# Patient Record
Sex: MALE | Race: BLACK OR AFRICAN AMERICAN | NOT HISPANIC OR LATINO | Employment: OTHER | URBAN - METROPOLITAN AREA
[De-identification: names, ages, dates, MRNs, and addresses within clinical notes are randomized per-mention and may not be internally consistent; named-entity substitution may affect disease eponyms.]

---

## 2023-02-08 PROBLEM — E78.2 HYPERLIPEMIA, MIXED: Status: ACTIVE | Noted: 2023-02-08

## 2023-02-08 PROBLEM — N52.9 MALE ERECTILE DISORDER OF ORGANIC ORIGIN: Status: ACTIVE | Noted: 2023-02-08

## 2023-02-08 PROBLEM — I10 ESSENTIAL (PRIMARY) HYPERTENSION: Status: ACTIVE | Noted: 2023-02-08

## 2023-02-08 PROBLEM — E11.9 CONTROLLED TYPE 2 DIABETES MELLITUS WITHOUT COMPLICATION, WITHOUT LONG-TERM CURRENT USE OF INSULIN (MULTI): Status: ACTIVE | Noted: 2023-02-08

## 2023-02-08 RX ORDER — ATENOLOL 50 MG/1
1 TABLET ORAL DAILY
COMMUNITY
Start: 2021-03-28 | End: 2023-08-14 | Stop reason: SDUPTHER

## 2023-02-08 RX ORDER — TIMOLOL MALEATE 5 MG/ML
SOLUTION/ DROPS OPHTHALMIC
COMMUNITY
Start: 2021-09-11

## 2023-02-08 RX ORDER — METFORMIN HYDROCHLORIDE 1000 MG/1
1 TABLET ORAL 2 TIMES DAILY
COMMUNITY
Start: 2021-01-24 | End: 2023-04-26 | Stop reason: SDUPTHER

## 2023-02-08 RX ORDER — LISINOPRIL 20 MG/1
1 TABLET ORAL DAILY
COMMUNITY
Start: 2021-01-31 | End: 2023-08-07

## 2023-02-08 RX ORDER — LATANOPROST 50 UG/ML
SOLUTION/ DROPS OPHTHALMIC
COMMUNITY
Start: 2020-04-30

## 2023-02-08 RX ORDER — LOVASTATIN 20 MG/1
1 TABLET ORAL DAILY
COMMUNITY
Start: 2021-02-07 | End: 2023-08-07

## 2023-02-08 RX ORDER — GLIPIZIDE 5 MG/1
1 TABLET ORAL DAILY
COMMUNITY
Start: 2021-07-19 | End: 2023-07-12 | Stop reason: DRUGHIGH

## 2023-02-08 RX ORDER — SILDENAFIL 100 MG/1
.5-1 TABLET, FILM COATED ORAL AS NEEDED
COMMUNITY
Start: 2021-01-10 | End: 2023-07-07 | Stop reason: SDUPTHER

## 2023-02-08 RX ORDER — HYDROCHLOROTHIAZIDE 25 MG/1
0.5 TABLET ORAL DAILY
COMMUNITY
Start: 2020-12-03 | End: 2023-08-14

## 2023-03-22 ENCOUNTER — OFFICE VISIT (OUTPATIENT)
Dept: PRIMARY CARE | Facility: CLINIC | Age: 73
End: 2023-03-22
Payer: MEDICARE

## 2023-03-22 VITALS
SYSTOLIC BLOOD PRESSURE: 132 MMHG | BODY MASS INDEX: 25.29 KG/M2 | HEART RATE: 74 BPM | OXYGEN SATURATION: 97 % | HEIGHT: 73 IN | DIASTOLIC BLOOD PRESSURE: 85 MMHG | WEIGHT: 190.8 LBS | TEMPERATURE: 97 F

## 2023-03-22 DIAGNOSIS — E66.3 OVERWEIGHT WITH BODY MASS INDEX (BMI) 25.0-29.9: ICD-10-CM

## 2023-03-22 DIAGNOSIS — E11.9 CONTROLLED TYPE 2 DIABETES MELLITUS WITHOUT COMPLICATION, WITHOUT LONG-TERM CURRENT USE OF INSULIN (MULTI): Primary | ICD-10-CM

## 2023-03-22 DIAGNOSIS — I10 ESSENTIAL (PRIMARY) HYPERTENSION: ICD-10-CM

## 2023-03-22 DIAGNOSIS — E11.65 TYPE 2 DIABETES MELLITUS WITH HYPERGLYCEMIA, WITHOUT LONG-TERM CURRENT USE OF INSULIN (MULTI): ICD-10-CM

## 2023-03-22 DIAGNOSIS — E78.2 HYPERLIPEMIA, MIXED: ICD-10-CM

## 2023-03-22 LAB — POC HEMOGLOBIN A1C: 7.7 % (ref 4.2–6.5)

## 2023-03-22 PROCEDURE — 99213 OFFICE O/P EST LOW 20 MIN: CPT | Performed by: FAMILY MEDICINE

## 2023-03-22 PROCEDURE — 3075F SYST BP GE 130 - 139MM HG: CPT | Performed by: FAMILY MEDICINE

## 2023-03-22 PROCEDURE — 1159F MED LIST DOCD IN RCRD: CPT | Performed by: FAMILY MEDICINE

## 2023-03-22 PROCEDURE — 83036 HEMOGLOBIN GLYCOSYLATED A1C: CPT | Performed by: FAMILY MEDICINE

## 2023-03-22 PROCEDURE — 4010F ACE/ARB THERAPY RXD/TAKEN: CPT | Performed by: FAMILY MEDICINE

## 2023-03-22 PROCEDURE — 3079F DIAST BP 80-89 MM HG: CPT | Performed by: FAMILY MEDICINE

## 2023-03-22 PROCEDURE — 1160F RVW MEDS BY RX/DR IN RCRD: CPT | Performed by: FAMILY MEDICINE

## 2023-03-22 PROCEDURE — 1036F TOBACCO NON-USER: CPT | Performed by: FAMILY MEDICINE

## 2023-03-22 ASSESSMENT — ENCOUNTER SYMPTOMS
WEIGHT LOSS: 0
BLURRED VISION: 0
FATIGUE: 0

## 2023-03-22 ASSESSMENT — PATIENT HEALTH QUESTIONNAIRE - PHQ9
SUM OF ALL RESPONSES TO PHQ9 QUESTIONS 1 AND 2: 0
2. FEELING DOWN, DEPRESSED OR HOPELESS: NOT AT ALL
1. LITTLE INTEREST OR PLEASURE IN DOING THINGS: NOT AT ALL

## 2023-03-22 ASSESSMENT — PAIN SCALES - GENERAL: PAINLEVEL: 0-NO PAIN

## 2023-03-22 NOTE — ASSESSMENT & PLAN NOTE
A1C up, at 7.7.  Continue glipizide 5 mg daily and 1000 mg metformin BID.  Recheck 3-4 months.  Work on improving diet

## 2023-03-22 NOTE — PATIENT INSTRUCTIONS
A1C 7.7, is up from last time.  Work on improving diet.  Continue Glipizide 5 mg daily and metformin 1000 mg twice a day.  Plan to follow up in 3-4 months, and will recheck A1C, along with blood work for cholesterol and kidneys.  For diabetes:  Continue current medicines.                                              Treatment goals include:  HgbA1C less than 7.5  /80 on consistent basis, with no higher than 140/85  LDL cholesterol less than 100, and HDL cholesterol above 40.  Yearly retinal exam  Check feet periodically for sores or decreased sensation  Exercise at least 150 minutes weekly.  Work toward a goal BMI of less than 25.    BP is controlled.  Continue lisinopril 20 mg and hydrochlorothiazide 25 mg daily.

## 2023-03-22 NOTE — PROGRESS NOTES
"Subjective   Patient ID: Juan Alberto Salcido is a 72 y.o. male who presents for Diabetes (Pt. Presents for DM follow up ).  A1C has gone up since last time.  Eating too much and drinking pop, per patient.  Glucose 167 this AM.   Feeling well   No swelling in extremities.  Not checking BP at home.    Diabetes  He presents for his follow-up diabetic visit. He has type 2 diabetes mellitus. There are no hypoglycemic associated symptoms. Pertinent negatives for diabetes include no blurred vision, no chest pain, no fatigue, no foot paresthesias and no weight loss. Risk factors for coronary artery disease include diabetes mellitus, dyslipidemia and hypertension. He is compliant with treatment all of the time.       Review of Systems   Constitutional:  Negative for fatigue and weight loss.   Eyes:  Negative for blurred vision.   Cardiovascular:  Negative for chest pain.       Objective   /85 (BP Location: Right arm, Patient Position: Sitting, BP Cuff Size: Adult)   Pulse 74   Temp 36.1 °C (97 °F) (Temporal)   Ht 1.854 m (6' 1\")   Wt 86.5 kg (190 lb 12.8 oz)   SpO2 97%   BMI 25.17 kg/m²     Physical Exam  Constitutional:       Appearance: Normal appearance.   Cardiovascular:      Rate and Rhythm: Normal rate and regular rhythm.      Heart sounds: No murmur heard.  Musculoskeletal:      Right lower leg: No edema.      Left lower leg: No edema.   Neurological:      Mental Status: He is alert.           Assessment/Plan   Problem List Items Addressed This Visit          Circulatory    Essential (primary) hypertension     BP controlled.  Continue lisinopril 10 mg 25 mg hydrochlorothiazide  Check blood work next time.            Endocrine/Metabolic    Type 2 diabetes mellitus with hyperglycemia, without long-term current use of insulin (CMS/[facility]) - Primary     A1C up, at 7.7.  Continue glipizide 5 mg daily and 1000 mg metformin BID.  Recheck 3-4 months.  Work on improving diet            Other    Hyperlipemia, mixed     " Continue lovastatin.          Other Visit Diagnoses       Overweight with body mass index (BMI) 25.0-29.9

## 2023-04-26 DIAGNOSIS — E11.65 TYPE 2 DIABETES MELLITUS WITH HYPERGLYCEMIA, WITHOUT LONG-TERM CURRENT USE OF INSULIN (MULTI): Primary | ICD-10-CM

## 2023-04-26 RX ORDER — METFORMIN HYDROCHLORIDE 1000 MG/1
1000 TABLET ORAL 2 TIMES DAILY
Qty: 180 TABLET | Refills: 1 | Status: SHIPPED | OUTPATIENT
Start: 2023-04-26 | End: 2023-10-24

## 2023-07-07 DIAGNOSIS — N52.9 MALE ERECTILE DISORDER OF ORGANIC ORIGIN: Primary | ICD-10-CM

## 2023-07-07 RX ORDER — SILDENAFIL 100 MG/1
50-100 TABLET, FILM COATED ORAL AS NEEDED
Qty: 10 TABLET | Refills: 0 | Status: SHIPPED | OUTPATIENT
Start: 2023-07-07 | End: 2023-07-10 | Stop reason: SDUPTHER

## 2023-07-10 DIAGNOSIS — N52.9 MALE ERECTILE DISORDER OF ORGANIC ORIGIN: ICD-10-CM

## 2023-07-10 RX ORDER — SILDENAFIL 100 MG/1
50-100 TABLET, FILM COATED ORAL AS NEEDED
Qty: 10 TABLET | Refills: 0 | Status: SHIPPED | OUTPATIENT
Start: 2023-07-10 | End: 2023-08-09

## 2023-07-12 ENCOUNTER — OFFICE VISIT (OUTPATIENT)
Dept: PRIMARY CARE | Facility: CLINIC | Age: 73
End: 2023-07-12
Payer: MEDICARE

## 2023-07-12 VITALS
WEIGHT: 188 LBS | RESPIRATION RATE: 16 BRPM | BODY MASS INDEX: 24.92 KG/M2 | DIASTOLIC BLOOD PRESSURE: 80 MMHG | HEART RATE: 75 BPM | HEIGHT: 73 IN | OXYGEN SATURATION: 98 % | SYSTOLIC BLOOD PRESSURE: 120 MMHG

## 2023-07-12 DIAGNOSIS — I10 ESSENTIAL (PRIMARY) HYPERTENSION: ICD-10-CM

## 2023-07-12 DIAGNOSIS — E78.2 HYPERLIPEMIA, MIXED: ICD-10-CM

## 2023-07-12 DIAGNOSIS — E11.65 TYPE 2 DIABETES MELLITUS WITH HYPERGLYCEMIA, WITHOUT LONG-TERM CURRENT USE OF INSULIN (MULTI): Primary | ICD-10-CM

## 2023-07-12 LAB
ALBUMIN (MG/L) IN URINE: 201.5 MG/L
ALBUMIN/CREATININE (UG/MG) IN URINE: 82.6 UG/MG CRT (ref 0–30)
ANION GAP IN SER/PLAS: 14 MMOL/L (ref 10–20)
CALCIUM (MG/DL) IN SER/PLAS: 9.6 MG/DL (ref 8.6–10.6)
CARBON DIOXIDE, TOTAL (MMOL/L) IN SER/PLAS: 30 MMOL/L (ref 21–32)
CHLORIDE (MMOL/L) IN SER/PLAS: 100 MMOL/L (ref 98–107)
CHOLESTEROL (MG/DL) IN SER/PLAS: 75 MG/DL (ref 0–199)
CHOLESTEROL IN HDL (MG/DL) IN SER/PLAS: 39.7 MG/DL
CHOLESTEROL/HDL RATIO: 1.9
CREATININE (MG/DL) IN SER/PLAS: 0.88 MG/DL (ref 0.5–1.3)
CREATININE (MG/DL) IN URINE: 244 MG/DL (ref 20–370)
GFR MALE: >90 ML/MIN/1.73M2
GLUCOSE (MG/DL) IN SER/PLAS: 159 MG/DL (ref 74–99)
LDL: 10 MG/DL (ref 0–99)
POC HEMOGLOBIN A1C: 7.7 % (ref 4.2–6.5)
POTASSIUM (MMOL/L) IN SER/PLAS: 4.5 MMOL/L (ref 3.5–5.3)
SODIUM (MMOL/L) IN SER/PLAS: 139 MMOL/L (ref 136–145)
TRIGLYCERIDE (MG/DL) IN SER/PLAS: 126 MG/DL (ref 0–149)
UREA NITROGEN (MG/DL) IN SER/PLAS: 11 MG/DL (ref 6–23)
VLDL: 25 MG/DL (ref 0–40)

## 2023-07-12 PROCEDURE — 83036 HEMOGLOBIN GLYCOSYLATED A1C: CPT | Performed by: FAMILY MEDICINE

## 2023-07-12 PROCEDURE — 1160F RVW MEDS BY RX/DR IN RCRD: CPT | Performed by: FAMILY MEDICINE

## 2023-07-12 PROCEDURE — 3074F SYST BP LT 130 MM HG: CPT | Performed by: FAMILY MEDICINE

## 2023-07-12 PROCEDURE — 80061 LIPID PANEL: CPT

## 2023-07-12 PROCEDURE — 82043 UR ALBUMIN QUANTITATIVE: CPT

## 2023-07-12 PROCEDURE — 1126F AMNT PAIN NOTED NONE PRSNT: CPT | Performed by: FAMILY MEDICINE

## 2023-07-12 PROCEDURE — 80048 BASIC METABOLIC PNL TOTAL CA: CPT

## 2023-07-12 PROCEDURE — 82570 ASSAY OF URINE CREATININE: CPT

## 2023-07-12 PROCEDURE — 4010F ACE/ARB THERAPY RXD/TAKEN: CPT | Performed by: FAMILY MEDICINE

## 2023-07-12 PROCEDURE — 3079F DIAST BP 80-89 MM HG: CPT | Performed by: FAMILY MEDICINE

## 2023-07-12 PROCEDURE — 1036F TOBACCO NON-USER: CPT | Performed by: FAMILY MEDICINE

## 2023-07-12 PROCEDURE — 1159F MED LIST DOCD IN RCRD: CPT | Performed by: FAMILY MEDICINE

## 2023-07-12 PROCEDURE — 3008F BODY MASS INDEX DOCD: CPT | Performed by: FAMILY MEDICINE

## 2023-07-12 PROCEDURE — 99213 OFFICE O/P EST LOW 20 MIN: CPT | Performed by: FAMILY MEDICINE

## 2023-07-12 RX ORDER — GLIPIZIDE 5 MG/1
5 TABLET ORAL
Qty: 180 TABLET | Refills: 1 | Status: SHIPPED | OUTPATIENT
Start: 2023-07-12 | End: 2023-09-26 | Stop reason: SDUPTHER

## 2023-07-12 ASSESSMENT — PATIENT HEALTH QUESTIONNAIRE - PHQ9
2. FEELING DOWN, DEPRESSED OR HOPELESS: NOT AT ALL
SUM OF ALL RESPONSES TO PHQ9 QUESTIONS 1 AND 2: 0
1. LITTLE INTEREST OR PLEASURE IN DOING THINGS: NOT AT ALL

## 2023-07-12 ASSESSMENT — PAIN SCALES - GENERAL: PAINLEVEL: 0-NO PAIN

## 2023-07-12 NOTE — PROGRESS NOTES
"Subjective   Patient ID: Juan Alberto Salcido is a 73 y.o. male who presents for Diabetes and Hypertension.  Checks glucose once in a while.  Has been a little  high.  Taking metformin 1000 mg twice a day and glipizide 5 mg  daily.  No SE to medication.      Diabetes  He presents for his follow-up diabetic visit. He has type 2 diabetes mellitus. His disease course has been stable. There are no hypoglycemic associated symptoms. Pertinent negatives for diabetes include no blurred vision, no chest pain and no foot paresthesias. Risk factors for coronary artery disease include diabetes mellitus, dyslipidemia, hypertension and male sex. Current diabetic treatment includes oral agent (dual therapy). He is compliant with treatment all of the time. He is following a generally healthy diet. An ACE inhibitor/angiotensin II receptor blocker is being taken.   Hypertension  This is a chronic problem. The problem is controlled. Pertinent negatives include no blurred vision, chest pain, palpitations, peripheral edema or shortness of breath.       Review of Systems   Eyes:  Negative for blurred vision.   Respiratory:  Negative for shortness of breath.    Cardiovascular:  Negative for chest pain and palpitations.       Objective   /80 (BP Location: Left arm, Patient Position: Sitting, BP Cuff Size: Adult)   Pulse 75   Resp 16   Ht 1.854 m (6' 1\")   Wt 85.3 kg (188 lb)   SpO2 98%   BMI 24.80 kg/m²     Physical Exam  Vitals reviewed.   Constitutional:       Appearance: Normal appearance.   Cardiovascular:      Rate and Rhythm: Normal rate and regular rhythm.      Heart sounds: No murmur heard.  Pulmonary:      Effort: Pulmonary effort is normal.      Breath sounds: Normal breath sounds.   Musculoskeletal:      Right lower leg: No edema.      Left lower leg: No edema.   Neurological:      Mental Status: He is alert.           Assessment/Plan   Problem List Items Addressed This Visit       Type 2 diabetes mellitus with " hyperglycemia, without long-term current use of insulin (CMS/[facility]) - Primary    Relevant Orders    POCT glycosylated hemoglobin (Hb A1C) manually resulted (Completed)    Albumin , Urine Random (Completed)    Basic Metabolic Panel (Completed)    Lipid Panel (Completed)    Essential (primary) hypertension    Hyperlipemia, mixed     Other Visit Diagnoses       Body mass index (BMI) 24.0-24.9, adult

## 2023-07-12 NOTE — PATIENT INSTRUCTIONS
A1C 7.7, same as last time.  Work on improving diet.  Increase Glipizide 5 mg to twice a day,  and continue metformin 1000 mg twice a day.  Plan to follow up in 3-4 months, and will recheck A1C.  Will check fasting  blood work this morning.  For diabetes:  Continue current medicines.                                              Treatment goals include:  HgbA1C less than 7.5  /80 on consistent basis, with no higher than 140/85  LDL cholesterol less than 100, and HDL cholesterol above 40.  Yearly retinal exam  Check feet periodically for sores or decreased sensation  Exercise at least 150 minutes weekly.  Work toward a goal BMI of less than 25.     BP is controlled.  Continue lisinopril 20 mg and hydrochlorothiazide 25 mg daily.

## 2023-07-13 ASSESSMENT — ENCOUNTER SYMPTOMS
PALPITATIONS: 0
HYPERTENSION: 1
BLURRED VISION: 0
SHORTNESS OF BREATH: 0

## 2023-08-07 DIAGNOSIS — E78.2 HYPERLIPEMIA, MIXED: ICD-10-CM

## 2023-08-07 DIAGNOSIS — I10 ESSENTIAL (PRIMARY) HYPERTENSION: Primary | ICD-10-CM

## 2023-08-07 RX ORDER — LISINOPRIL 20 MG/1
20 TABLET ORAL DAILY
Qty: 90 TABLET | Refills: 0 | Status: SHIPPED | OUTPATIENT
Start: 2023-08-07 | End: 2023-11-13

## 2023-08-07 RX ORDER — LOVASTATIN 20 MG/1
20 TABLET ORAL DAILY
Qty: 90 TABLET | Refills: 0 | Status: SHIPPED | OUTPATIENT
Start: 2023-08-07 | End: 2023-11-13

## 2023-08-13 DIAGNOSIS — I10 ESSENTIAL (PRIMARY) HYPERTENSION: Primary | ICD-10-CM

## 2023-08-14 RX ORDER — HYDROCHLOROTHIAZIDE 25 MG/1
12.5 TABLET ORAL DAILY
Qty: 45 TABLET | Refills: 1 | Status: SHIPPED | OUTPATIENT
Start: 2023-08-14 | End: 2024-03-03

## 2023-08-14 RX ORDER — ATENOLOL 50 MG/1
50 TABLET ORAL DAILY
Qty: 90 TABLET | Refills: 1 | Status: SHIPPED | OUTPATIENT
Start: 2023-08-14 | End: 2024-02-10

## 2023-09-26 DIAGNOSIS — E11.65 TYPE 2 DIABETES MELLITUS WITH HYPERGLYCEMIA, WITHOUT LONG-TERM CURRENT USE OF INSULIN (MULTI): Primary | ICD-10-CM

## 2023-09-26 RX ORDER — GLIPIZIDE 5 MG/1
5 TABLET ORAL
Qty: 180 TABLET | Refills: 1 | Status: SHIPPED | OUTPATIENT
Start: 2023-09-26 | End: 2024-04-08

## 2023-10-25 ENCOUNTER — OFFICE VISIT (OUTPATIENT)
Dept: PRIMARY CARE | Facility: CLINIC | Age: 73
End: 2023-10-25
Payer: MEDICARE

## 2023-10-25 VITALS
HEART RATE: 80 BPM | WEIGHT: 186 LBS | HEIGHT: 73 IN | TEMPERATURE: 98.2 F | SYSTOLIC BLOOD PRESSURE: 118 MMHG | OXYGEN SATURATION: 98 % | BODY MASS INDEX: 24.65 KG/M2 | DIASTOLIC BLOOD PRESSURE: 74 MMHG

## 2023-10-25 DIAGNOSIS — E11.65 TYPE 2 DIABETES MELLITUS WITH HYPERGLYCEMIA, WITHOUT LONG-TERM CURRENT USE OF INSULIN (MULTI): Primary | ICD-10-CM

## 2023-10-25 DIAGNOSIS — N52.9 MALE ERECTILE DISORDER OF ORGANIC ORIGIN: ICD-10-CM

## 2023-10-25 DIAGNOSIS — Z23 ENCOUNTER FOR IMMUNIZATION: ICD-10-CM

## 2023-10-25 DIAGNOSIS — S83.412A SPRAIN OF MEDIAL COLLATERAL LIGAMENT OF LEFT KNEE, INITIAL ENCOUNTER: ICD-10-CM

## 2023-10-25 DIAGNOSIS — I10 ESSENTIAL (PRIMARY) HYPERTENSION: ICD-10-CM

## 2023-10-25 LAB — POC HEMOGLOBIN A1C: 7.7 % (ref 4.2–6.5)

## 2023-10-25 PROCEDURE — 4010F ACE/ARB THERAPY RXD/TAKEN: CPT | Performed by: FAMILY MEDICINE

## 2023-10-25 PROCEDURE — 1036F TOBACCO NON-USER: CPT | Performed by: FAMILY MEDICINE

## 2023-10-25 PROCEDURE — 90662 IIV NO PRSV INCREASED AG IM: CPT | Performed by: FAMILY MEDICINE

## 2023-10-25 PROCEDURE — G0008 ADMIN INFLUENZA VIRUS VAC: HCPCS | Performed by: FAMILY MEDICINE

## 2023-10-25 PROCEDURE — 3008F BODY MASS INDEX DOCD: CPT | Performed by: FAMILY MEDICINE

## 2023-10-25 PROCEDURE — 99214 OFFICE O/P EST MOD 30 MIN: CPT | Performed by: FAMILY MEDICINE

## 2023-10-25 PROCEDURE — 1160F RVW MEDS BY RX/DR IN RCRD: CPT | Performed by: FAMILY MEDICINE

## 2023-10-25 PROCEDURE — 3078F DIAST BP <80 MM HG: CPT | Performed by: FAMILY MEDICINE

## 2023-10-25 PROCEDURE — 83036 HEMOGLOBIN GLYCOSYLATED A1C: CPT | Performed by: FAMILY MEDICINE

## 2023-10-25 PROCEDURE — 1125F AMNT PAIN NOTED PAIN PRSNT: CPT | Performed by: FAMILY MEDICINE

## 2023-10-25 PROCEDURE — 1159F MED LIST DOCD IN RCRD: CPT | Performed by: FAMILY MEDICINE

## 2023-10-25 PROCEDURE — 3074F SYST BP LT 130 MM HG: CPT | Performed by: FAMILY MEDICINE

## 2023-10-25 RX ORDER — TADALAFIL 20 MG/1
20 TABLET ORAL DAILY PRN
Qty: 12 TABLET | Refills: 3 | Status: SHIPPED | OUTPATIENT
Start: 2023-10-25 | End: 2024-10-24

## 2023-10-25 ASSESSMENT — PATIENT HEALTH QUESTIONNAIRE - PHQ9
SUM OF ALL RESPONSES TO PHQ9 QUESTIONS 1 AND 2: 0
1. LITTLE INTEREST OR PLEASURE IN DOING THINGS: NOT AT ALL
2. FEELING DOWN, DEPRESSED OR HOPELESS: NOT AT ALL

## 2023-10-25 ASSESSMENT — PAIN SCALES - GENERAL: PAINLEVEL: 5

## 2023-10-25 ASSESSMENT — ENCOUNTER SYMPTOMS: DIABETIC ASSOCIATED SYMPTOMS: 0

## 2023-10-25 NOTE — PROGRESS NOTES
"Subjective   Patient ID: Juan Alberto Salcido is a 73 y.o. male who presents for Diabetes and Knee Pain (Chronic left knee).  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  Probably eats more carbs than should.    Left knee twisted a while back.  No swelling.  Still hurts a little.  Hurts more when on feet    Viagra not working as well as it used to.    Taking glipizide BID and metformin BID.    Diabetes  He presents for his follow-up diabetic visit. He has type 2 diabetes mellitus. His disease course has been stable. There are no hypoglycemic associated symptoms. There are no diabetic associated symptoms. Diabetic complications include impotence. Pertinent negatives for diabetic complications include no autonomic neuropathy, CVA, heart disease or nephropathy.   Knee Pain       Review of Systems   Genitourinary:  Positive for impotence.       Objective   /74 (BP Location: Left arm, Patient Position: Sitting, BP Cuff Size: Small adult)   Pulse 80   Temp 36.8 °C (98.2 °F) (Oral)   Ht 1.854 m (6' 1\")   Wt 84.4 kg (186 lb)   SpO2 98%   BMI 24.54 kg/m²     Physical Exam  Vitals reviewed.   Constitutional:       Appearance: Normal appearance.   Cardiovascular:      Rate and Rhythm: Normal rate and regular rhythm.      Heart sounds: No murmur heard.  Pulmonary:      Effort: Pulmonary effort is normal.      Breath sounds: Normal breath sounds.   Musculoskeletal:      Left knee: No swelling, deformity, effusion, erythema or crepitus. Normal range of motion. Tenderness (medially) present over the medial joint line. No LCL laxity, MCL laxity, ACL laxity or PCL laxity.     Instability Tests: Medial Lovely test negative and lateral Lovely test negative.      Right lower leg: No edema.      Left lower leg: No edema.   Neurological:      Mental Status: He is alert.         Assessment/Plan   Problem List Items Addressed This Visit       Type 2 diabetes mellitus with hyperglycemia, without long-term current use of insulin (CMS/[facility]) "    Relevant Orders    POCT glycosylated hemoglobin (Hb A1C) manually resulted

## 2023-10-25 NOTE — PATIENT INSTRUCTIONS
A1C 7.7, same as last time.  Work on improving diet.  Continue Glipizide 5 mg twice a day,  and continue metformin 1000 mg twice a day.  Plan to follow up in 3-4 months, and will recheck A1C.                                               Treatment goals include:  HgbA1C less than 7.5  /80 on consistent basis, with no higher than 140/85  LDL cholesterol less than 100, and HDL cholesterol above 40.  Yearly retinal exam  Check feet periodically for sores or decreased sensation  Exercise at least 150 minutes weekly.  Work toward a goal BMI of less than 25.     BP is controlled.  Continue lisinopril 20 mg and hydrochlorothiazide 25 mg daily.              For ED, will try Cialis instead of Viagra.  There is a 72 hour period of time in which it should work.    For knee continue knee support at work.  Ice if needed.  Follow up if worsening.

## 2024-01-28 DIAGNOSIS — E11.65 TYPE 2 DIABETES MELLITUS WITH HYPERGLYCEMIA, WITHOUT LONG-TERM CURRENT USE OF INSULIN (MULTI): ICD-10-CM

## 2024-01-28 RX ORDER — METFORMIN HYDROCHLORIDE 1000 MG/1
1000 TABLET ORAL 2 TIMES DAILY
Qty: 180 TABLET | Refills: 0 | Status: SHIPPED | OUTPATIENT
Start: 2024-01-28 | End: 2024-05-08

## 2024-02-28 ENCOUNTER — OFFICE VISIT (OUTPATIENT)
Dept: PRIMARY CARE | Facility: CLINIC | Age: 74
End: 2024-02-28
Payer: MEDICARE

## 2024-02-28 VITALS
WEIGHT: 182 LBS | BODY MASS INDEX: 24.12 KG/M2 | HEIGHT: 73 IN | OXYGEN SATURATION: 97 % | SYSTOLIC BLOOD PRESSURE: 114 MMHG | DIASTOLIC BLOOD PRESSURE: 80 MMHG | TEMPERATURE: 97.6 F | HEART RATE: 87 BPM

## 2024-02-28 DIAGNOSIS — E11.65 TYPE 2 DIABETES MELLITUS WITH HYPERGLYCEMIA, WITHOUT LONG-TERM CURRENT USE OF INSULIN (MULTI): ICD-10-CM

## 2024-02-28 DIAGNOSIS — Z23 NEED FOR VACCINATION: ICD-10-CM

## 2024-02-28 DIAGNOSIS — E78.2 HYPERLIPEMIA, MIXED: ICD-10-CM

## 2024-02-28 DIAGNOSIS — Z13.6 SCREENING FOR CARDIOVASCULAR CONDITION: ICD-10-CM

## 2024-02-28 DIAGNOSIS — I10 ESSENTIAL (PRIMARY) HYPERTENSION: ICD-10-CM

## 2024-02-28 DIAGNOSIS — N52.9 MALE ERECTILE DISORDER OF ORGANIC ORIGIN: ICD-10-CM

## 2024-02-28 DIAGNOSIS — Z12.5 SCREENING FOR PROSTATE CANCER: ICD-10-CM

## 2024-02-28 DIAGNOSIS — Z00.00 ROUTINE GENERAL MEDICAL EXAMINATION AT HEALTH CARE FACILITY: Primary | ICD-10-CM

## 2024-02-28 DIAGNOSIS — Z13.1 DIABETES MELLITUS SCREENING: ICD-10-CM

## 2024-02-28 LAB
ANION GAP SERPL CALC-SCNC: 15 MMOL/L (ref 10–20)
BUN SERPL-MCNC: 22 MG/DL (ref 6–23)
CALCIUM SERPL-MCNC: 9.7 MG/DL (ref 8.6–10.6)
CHLORIDE SERPL-SCNC: 99 MMOL/L (ref 98–107)
CHOLEST SERPL-MCNC: 75 MG/DL (ref 0–199)
CHOLESTEROL/HDL RATIO: 2
CO2 SERPL-SCNC: 29 MMOL/L (ref 21–32)
CREAT SERPL-MCNC: 1.09 MG/DL (ref 0.5–1.3)
EGFRCR SERPLBLD CKD-EPI 2021: 72 ML/MIN/1.73M*2
GLUCOSE SERPL-MCNC: 140 MG/DL (ref 74–99)
HDLC SERPL-MCNC: 38.3 MG/DL
LDLC SERPL CALC-MCNC: 14 MG/DL
NON HDL CHOLESTEROL: 37 MG/DL (ref 0–149)
POTASSIUM SERPL-SCNC: 4.6 MMOL/L (ref 3.5–5.3)
PSA SERPL-MCNC: 1.09 NG/ML
SODIUM SERPL-SCNC: 138 MMOL/L (ref 136–145)
TRIGL SERPL-MCNC: 113 MG/DL (ref 0–149)
VLDL: 23 MG/DL (ref 0–40)

## 2024-02-28 PROCEDURE — 1160F RVW MEDS BY RX/DR IN RCRD: CPT | Performed by: FAMILY MEDICINE

## 2024-02-28 PROCEDURE — 1036F TOBACCO NON-USER: CPT | Performed by: FAMILY MEDICINE

## 2024-02-28 PROCEDURE — 80061 LIPID PANEL: CPT

## 2024-02-28 PROCEDURE — 3074F SYST BP LT 130 MM HG: CPT | Performed by: FAMILY MEDICINE

## 2024-02-28 PROCEDURE — 36415 COLL VENOUS BLD VENIPUNCTURE: CPT

## 2024-02-28 PROCEDURE — 99212 OFFICE O/P EST SF 10 MIN: CPT | Performed by: FAMILY MEDICINE

## 2024-02-28 PROCEDURE — G0103 PSA SCREENING: HCPCS

## 2024-02-28 PROCEDURE — 1159F MED LIST DOCD IN RCRD: CPT | Performed by: FAMILY MEDICINE

## 2024-02-28 PROCEDURE — G0439 PPPS, SUBSEQ VISIT: HCPCS | Performed by: FAMILY MEDICINE

## 2024-02-28 PROCEDURE — 99397 PER PM REEVAL EST PAT 65+ YR: CPT | Performed by: FAMILY MEDICINE

## 2024-02-28 PROCEDURE — 3008F BODY MASS INDEX DOCD: CPT | Performed by: FAMILY MEDICINE

## 2024-02-28 PROCEDURE — 80048 BASIC METABOLIC PNL TOTAL CA: CPT

## 2024-02-28 PROCEDURE — 1125F AMNT PAIN NOTED PAIN PRSNT: CPT | Performed by: FAMILY MEDICINE

## 2024-02-28 PROCEDURE — 3079F DIAST BP 80-89 MM HG: CPT | Performed by: FAMILY MEDICINE

## 2024-02-28 PROCEDURE — 4010F ACE/ARB THERAPY RXD/TAKEN: CPT | Performed by: FAMILY MEDICINE

## 2024-02-28 PROCEDURE — 1170F FXNL STATUS ASSESSED: CPT | Performed by: FAMILY MEDICINE

## 2024-02-28 RX ORDER — SILDENAFIL 100 MG/1
100 TABLET, FILM COATED ORAL DAILY PRN
Qty: 12 TABLET | Refills: 3 | Status: SHIPPED | OUTPATIENT
Start: 2024-02-28 | End: 2025-02-27

## 2024-02-28 ASSESSMENT — ACTIVITIES OF DAILY LIVING (ADL)
GROCERY_SHOPPING: INDEPENDENT
BATHING: INDEPENDENT
TAKING_MEDICATION: INDEPENDENT
DOING_HOUSEWORK: INDEPENDENT
MANAGING_FINANCES: INDEPENDENT
DRESSING: INDEPENDENT

## 2024-02-28 ASSESSMENT — ENCOUNTER SYMPTOMS
OCCASIONAL FEELINGS OF UNSTEADINESS: 0
LOSS OF SENSATION IN FEET: 0
DEPRESSION: 0

## 2024-02-28 NOTE — PATIENT INSTRUCTIONS
Immunizations recommended:  --Flu shot every fall. -done  --Prevnar 20 once, to reduce risk of pneumonia. Out of stock today, so can do at the pharmacy or at next visit.  --Shingrix, to reduce the risk of shingles.  It can be done at the pharmacy.  --Tetanus every 10 years.  --Covid vaccine.  --RSV vaccine once.    Colonoscopy should be done every 10 years after the age of 45, unless there was a previous abnormality, or family history of colon cancer. (Will check to see when done)  Alternatives would be Cologuard every 3 years (looks for genetic evidence of colon cancer in stool), or stool FIT stool test yearly (looks for microscopic blood in stool)    You should try to get 150 minutes of exercise weekly.  Be sure to eat a diet rich in fruits and vegetables, and low in saturated fats and sodium.  Strive for a healthy BMI of less than 25.     Make sure to wear sun screen when outside, and check for changing or unusual moles.    I recommend you get a Living Will and Durable Power of  for Healthcare. These documents state what care you would want if you couldn't speak for yourself. They help your loved ones in caring for you if that happens.   Once you have those forms completed, you can keep a copy on file with your doctor.    Specialists being seen:  None currently.      A1C 7.3 in January.  Work on improving diet.  Continue Glipizide 5 mg twice a day,  and continue metformin 1000 mg twice a day.  Plan to follow up in 3-4 months, and will recheck A1C.                                               Treatment goals include:  HgbA1C less than 7.5  /80 on consistent basis, with no higher than 140/85  LDL cholesterol less than 100, and HDL cholesterol above 40.  Yearly retinal exam  Check feet periodically for sores or decreased sensation  Exercise at least 150 minutes weekly.  Work toward a goal BMI of less than 25.     BP is controlled.  Continue lisinopril 20 mg and hydrochlorothiazide 25 mg daily.                For ED, will try name brand viagra,  and see if works better.

## 2024-02-28 NOTE — PROGRESS NOTES
"Subjective   Reason for Visit: Juan Alberto Salcido is an 73 y.o. male here for a Medicare Wellness visit.     Past Medical, Surgical, and Family History reviewed and updated in chart.    Reviewed all medications by prescribing practitioner or clinical pharmacist (such as prescriptions, OTCs, herbal therapies and supplements) and documented in the medical record.    Doing well.  Glucose at home continues to run a little high.  A1C in January was 7.3.    No CP, SOB.     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  Keeps  active.        Patient Care Team:  Deborah Hartmann MD as PCP - General (Family Medicine)  Trace Chaudhari MD as PCP - [employer] PCP     Review of Systems    Objective   Vitals:  /80 (BP Location: Right arm, Patient Position: Sitting, BP Cuff Size: Adult)   Pulse 87   Temp 36.4 °C (97.6 °F) (Oral)   Ht 1.854 m (6' 1\")   Wt 82.6 kg (182 lb)   SpO2 97%   BMI 24.01 kg/m²       Physical Exam  Vitals reviewed.   Constitutional:       Appearance: Normal appearance.   Cardiovascular:      Rate and Rhythm: Normal rate and regular rhythm.      Pulses:           Dorsalis pedis pulses are 2+ on the right side and 2+ on the left side.      Heart sounds: No murmur heard.  Pulmonary:      Effort: Pulmonary effort is normal.      Breath sounds: Normal breath sounds.   Musculoskeletal:      Right lower leg: No edema.      Left lower leg: No edema.   Feet:      Right foot:      Protective Sensation: 5 sites tested.  5 sites sensed.      Skin integrity: Skin integrity normal.      Left foot:      Protective Sensation: 5 sites tested.  5 sites sensed.      Skin integrity: Skin integrity normal.   Skin:     Capillary Refill: Capillary refill takes less than 2 seconds.   Neurological:      Mental Status: He is alert.   Psychiatric:         Mood and Affect: Mood normal.         Behavior: Behavior normal.         Assessment/Plan   Problem List Items Addressed This Visit       Type 2 diabetes mellitus " with hyperglycemia, without long-term current use of insulin (CMS/[facility])    Essential (primary) hypertension    Hyperlipemia, mixed    Male erectile disorder of organic origin    Relevant Medications    sildenafil (Viagra) 100 mg tablet     Other Visit Diagnoses       Routine general medical examination at health care facility    -  Primary    Diabetes mellitus screening        Relevant Orders    Basic Metabolic Panel    Screening for cardiovascular condition        Relevant Orders    Lipid panel    Need for vaccination        Screening for prostate cancer        Relevant Orders    Prostate Specific Antigen, Screen    Body mass index (BMI) of 24.0 to 24.9 in adult

## 2024-05-05 DIAGNOSIS — I10 ESSENTIAL (PRIMARY) HYPERTENSION: ICD-10-CM

## 2024-05-06 RX ORDER — LISINOPRIL 20 MG/1
20 TABLET ORAL DAILY
Qty: 90 TABLET | Refills: 0 | Status: SHIPPED | OUTPATIENT
Start: 2024-05-06

## 2024-05-19 DIAGNOSIS — E78.2 HYPERLIPEMIA, MIXED: ICD-10-CM

## 2024-05-20 RX ORDER — LOVASTATIN 20 MG/1
20 TABLET ORAL DAILY
Qty: 90 TABLET | Refills: 0 | Status: SHIPPED | OUTPATIENT
Start: 2024-05-20

## 2024-05-29 ENCOUNTER — OFFICE VISIT (OUTPATIENT)
Dept: PRIMARY CARE | Facility: CLINIC | Age: 74
End: 2024-05-29
Payer: MEDICARE

## 2024-05-29 VITALS
BODY MASS INDEX: 24.41 KG/M2 | HEIGHT: 73 IN | WEIGHT: 184.2 LBS | HEART RATE: 78 BPM | SYSTOLIC BLOOD PRESSURE: 125 MMHG | DIASTOLIC BLOOD PRESSURE: 70 MMHG | TEMPERATURE: 98.3 F | OXYGEN SATURATION: 97 %

## 2024-05-29 DIAGNOSIS — H61.23 BILATERAL IMPACTED CERUMEN: ICD-10-CM

## 2024-05-29 DIAGNOSIS — I10 ESSENTIAL (PRIMARY) HYPERTENSION: ICD-10-CM

## 2024-05-29 DIAGNOSIS — E11.65 TYPE 2 DIABETES MELLITUS WITH HYPERGLYCEMIA, WITHOUT LONG-TERM CURRENT USE OF INSULIN (MULTI): Primary | ICD-10-CM

## 2024-05-29 LAB — POC HEMOGLOBIN A1C: 7.4 % (ref 4.2–6.5)

## 2024-05-29 PROCEDURE — 1158F ADVNC CARE PLAN TLK DOCD: CPT | Performed by: FAMILY MEDICINE

## 2024-05-29 PROCEDURE — 1160F RVW MEDS BY RX/DR IN RCRD: CPT | Performed by: FAMILY MEDICINE

## 2024-05-29 PROCEDURE — 4010F ACE/ARB THERAPY RXD/TAKEN: CPT | Performed by: FAMILY MEDICINE

## 2024-05-29 PROCEDURE — 3048F LDL-C <100 MG/DL: CPT | Performed by: FAMILY MEDICINE

## 2024-05-29 PROCEDURE — 3074F SYST BP LT 130 MM HG: CPT | Performed by: FAMILY MEDICINE

## 2024-05-29 PROCEDURE — 1159F MED LIST DOCD IN RCRD: CPT | Performed by: FAMILY MEDICINE

## 2024-05-29 PROCEDURE — 99214 OFFICE O/P EST MOD 30 MIN: CPT | Performed by: FAMILY MEDICINE

## 2024-05-29 PROCEDURE — 3078F DIAST BP <80 MM HG: CPT | Performed by: FAMILY MEDICINE

## 2024-05-29 PROCEDURE — 1036F TOBACCO NON-USER: CPT | Performed by: FAMILY MEDICINE

## 2024-05-29 PROCEDURE — 1123F ACP DISCUSS/DSCN MKR DOCD: CPT | Performed by: FAMILY MEDICINE

## 2024-05-29 PROCEDURE — 3008F BODY MASS INDEX DOCD: CPT | Performed by: FAMILY MEDICINE

## 2024-05-29 PROCEDURE — 83036 HEMOGLOBIN GLYCOSYLATED A1C: CPT | Performed by: FAMILY MEDICINE

## 2024-05-29 ASSESSMENT — ANXIETY QUESTIONNAIRES
5. BEING SO RESTLESS THAT IT IS HARD TO SIT STILL: NOT AT ALL
4. TROUBLE RELAXING: NOT AT ALL
3. WORRYING TOO MUCH ABOUT DIFFERENT THINGS: NOT AT ALL
6. BECOMING EASILY ANNOYED OR IRRITABLE: NOT AT ALL
1. FEELING NERVOUS, ANXIOUS, OR ON EDGE: NOT AT ALL
GAD7 TOTAL SCORE: 0
2. NOT BEING ABLE TO STOP OR CONTROL WORRYING: NOT AT ALL
7. FEELING AFRAID AS IF SOMETHING AWFUL MIGHT HAPPEN: NOT AT ALL

## 2024-05-29 ASSESSMENT — ENCOUNTER SYMPTOMS
LOSS OF SENSATION IN FEET: 0
DIABETIC ASSOCIATED SYMPTOMS: 0
OCCASIONAL FEELINGS OF UNSTEADINESS: 0
DEPRESSION: 0

## 2024-05-29 ASSESSMENT — PATIENT HEALTH QUESTIONNAIRE - PHQ9
1. LITTLE INTEREST OR PLEASURE IN DOING THINGS: NOT AT ALL
2. FEELING DOWN, DEPRESSED OR HOPELESS: NOT AT ALL
SUM OF ALL RESPONSES TO PHQ9 QUESTIONS 1 AND 2: 0

## 2024-05-29 NOTE — PATIENT INSTRUCTIONS
A1C 7.4, is up a bit from last time.  Continue metformin 1000 mg twice a day.  Increase glipizide 5 mg to 2 pills with breakfast, and 1  pill with dinner.  Plan to follow up for recheck in about 3 months.  If A1C  is still high, consider one of the new diabetic medications, such as Farxiga.    BP well controlled.  Continue lisinopril 20 mg and hydrochlorothiazide 25 mg daily.               For cholesterol, taking lovastatin.    Both ears with wax build up blocking canals.  Rinsed out, with partial success.  Can use debrox or similar to help  loosen remaining wax.    Info on coronary calcium score discussed.  Will order test to evaluate heart disease risk.

## 2024-05-29 NOTE — PROGRESS NOTES
"Subjective   Patient ID: Juan Alberto Salcido is a 74 y.o. male who presents for Follow-up (3 month ).  Occasionally checks glucose, around 140 in AM.  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  Also works in his yard.  Diet is pretty good.  Feeling well.    Gets some low back pain from doing a lot of lifting and bending at work, but not concerned about it.    Has a lot of wax in ears, so would like checked.  Wife says he can't hear    Diabetes  He presents for his follow-up diabetic visit. He has type 2 diabetes mellitus. His disease course has been stable. There are no hypoglycemic associated symptoms. There are no diabetic associated symptoms. There are no diabetic complications. Risk factors for coronary artery disease include diabetes mellitus, dyslipidemia, hypertension and male sex. He is compliant with treatment all of the time. He is following a generally healthy diet. An ACE inhibitor/angiotensin II receptor blocker is being taken.       Review of Systems    Objective   /70   Pulse 78   Temp 36.8 °C (98.3 °F)   Ht 1.854 m (6' 1\")   Wt 83.6 kg (184 lb 3.2 oz)   SpO2 97%   BMI 24.30 kg/m²     Physical Exam  Vitals reviewed.   Constitutional:       Appearance: Normal appearance.   HENT:      Right Ear: There is impacted cerumen.      Left Ear: There is impacted cerumen.   Cardiovascular:      Rate and Rhythm: Normal rate and regular rhythm.      Heart sounds: No murmur heard.  Pulmonary:      Effort: Pulmonary effort is normal.      Breath sounds: Normal breath sounds.   Musculoskeletal:      Right lower leg: No edema.      Left lower leg: No edema.   Neurological:      Mental Status: He is alert.           Assessment/Plan   Problem List Items Addressed This Visit       Type 2 diabetes mellitus with hyperglycemia, without long-term current use of insulin (Multi) - Primary    Relevant Orders    POCT glycosylated hemoglobin (Hb A1C) manually resulted (Completed)    CT cardiac scoring wo IV " contrast    Essential (primary) hypertension     Other Visit Diagnoses       Bilateral impacted cerumen

## 2024-06-02 DIAGNOSIS — I10 ESSENTIAL (PRIMARY) HYPERTENSION: ICD-10-CM

## 2024-06-03 RX ORDER — HYDROCHLOROTHIAZIDE 25 MG/1
12.5 TABLET ORAL DAILY
Qty: 45 TABLET | Refills: 1 | Status: SHIPPED | OUTPATIENT
Start: 2024-06-03

## 2024-06-03 RX ORDER — ATENOLOL 50 MG/1
50 TABLET ORAL DAILY
Qty: 90 TABLET | Refills: 1 | Status: SHIPPED | OUTPATIENT
Start: 2024-06-03

## 2024-06-23 DIAGNOSIS — E11.65 TYPE 2 DIABETES MELLITUS WITH HYPERGLYCEMIA, WITHOUT LONG-TERM CURRENT USE OF INSULIN (MULTI): ICD-10-CM

## 2024-06-24 RX ORDER — GLIPIZIDE 5 MG/1
5 TABLET ORAL
Qty: 180 TABLET | Refills: 0 | Status: SHIPPED | OUTPATIENT
Start: 2024-06-24

## 2024-06-24 RX ORDER — METFORMIN HYDROCHLORIDE 1000 MG/1
1000 TABLET ORAL 2 TIMES DAILY
Qty: 180 TABLET | Refills: 0 | Status: SHIPPED | OUTPATIENT
Start: 2024-06-24

## 2024-08-04 DIAGNOSIS — E78.2 HYPERLIPEMIA, MIXED: ICD-10-CM

## 2024-08-04 DIAGNOSIS — I10 ESSENTIAL (PRIMARY) HYPERTENSION: ICD-10-CM

## 2024-08-05 RX ORDER — LOVASTATIN 20 MG/1
20 TABLET ORAL DAILY
Qty: 90 TABLET | Refills: 0 | Status: SHIPPED | OUTPATIENT
Start: 2024-08-05

## 2024-08-05 RX ORDER — LISINOPRIL 20 MG/1
20 TABLET ORAL DAILY
Qty: 90 TABLET | Refills: 0 | Status: SHIPPED | OUTPATIENT
Start: 2024-08-05

## 2024-08-25 DIAGNOSIS — E11.65 TYPE 2 DIABETES MELLITUS WITH HYPERGLYCEMIA, WITHOUT LONG-TERM CURRENT USE OF INSULIN (MULTI): ICD-10-CM

## 2024-08-26 RX ORDER — GLIPIZIDE 5 MG/1
5 TABLET ORAL
Qty: 180 TABLET | Refills: 0 | Status: SHIPPED | OUTPATIENT
Start: 2024-08-26 | End: 2024-08-28 | Stop reason: SDUPTHER

## 2024-08-28 ENCOUNTER — TELEPHONE (OUTPATIENT)
Dept: PRIMARY CARE | Facility: CLINIC | Age: 74
End: 2024-08-28
Payer: MEDICARE

## 2024-08-28 DIAGNOSIS — E11.65 TYPE 2 DIABETES MELLITUS WITH HYPERGLYCEMIA, WITHOUT LONG-TERM CURRENT USE OF INSULIN (MULTI): ICD-10-CM

## 2024-08-28 DIAGNOSIS — N52.9 MALE ERECTILE DISORDER OF ORGANIC ORIGIN: Primary | ICD-10-CM

## 2024-08-28 RX ORDER — SILDENAFIL 100 MG/1
100 TABLET, FILM COATED ORAL DAILY PRN
Qty: 12 TABLET | Refills: 0 | Status: SHIPPED | OUTPATIENT
Start: 2024-08-28

## 2024-08-28 RX ORDER — GLIPIZIDE 5 MG/1
TABLET ORAL
Qty: 270 TABLET | Refills: 0 | Status: SHIPPED | OUTPATIENT
Start: 2024-08-28

## 2024-08-28 NOTE — TELEPHONE ENCOUNTER
Patient called states that at his last visit you informed him to take his Glipizide 5 mg 2 pills in the morning and 1 at night. He has run out of pills and the pharmacy says it is to soon to refill based on the old prescription. He would need a new prescription with the new instructions.

## 2024-09-04 ENCOUNTER — APPOINTMENT (OUTPATIENT)
Dept: PRIMARY CARE | Facility: CLINIC | Age: 74
End: 2024-09-04
Payer: MEDICARE

## 2024-09-04 VITALS
HEIGHT: 73 IN | TEMPERATURE: 97.8 F | HEART RATE: 65 BPM | OXYGEN SATURATION: 97 % | DIASTOLIC BLOOD PRESSURE: 79 MMHG | BODY MASS INDEX: 25.08 KG/M2 | SYSTOLIC BLOOD PRESSURE: 116 MMHG | WEIGHT: 189.2 LBS

## 2024-09-04 DIAGNOSIS — E11.65 TYPE 2 DIABETES MELLITUS WITH HYPERGLYCEMIA, WITHOUT LONG-TERM CURRENT USE OF INSULIN (MULTI): Primary | ICD-10-CM

## 2024-09-04 DIAGNOSIS — E78.2 HYPERLIPEMIA, MIXED: ICD-10-CM

## 2024-09-04 DIAGNOSIS — I10 ESSENTIAL (PRIMARY) HYPERTENSION: ICD-10-CM

## 2024-09-04 LAB — POC HEMOGLOBIN A1C: 7.5 % (ref 4.2–6.5)

## 2024-09-04 PROCEDURE — 3048F LDL-C <100 MG/DL: CPT | Performed by: FAMILY MEDICINE

## 2024-09-04 PROCEDURE — 1036F TOBACCO NON-USER: CPT | Performed by: FAMILY MEDICINE

## 2024-09-04 PROCEDURE — 1159F MED LIST DOCD IN RCRD: CPT | Performed by: FAMILY MEDICINE

## 2024-09-04 PROCEDURE — 1123F ACP DISCUSS/DSCN MKR DOCD: CPT | Performed by: FAMILY MEDICINE

## 2024-09-04 PROCEDURE — 3078F DIAST BP <80 MM HG: CPT | Performed by: FAMILY MEDICINE

## 2024-09-04 PROCEDURE — 3008F BODY MASS INDEX DOCD: CPT | Performed by: FAMILY MEDICINE

## 2024-09-04 PROCEDURE — 99213 OFFICE O/P EST LOW 20 MIN: CPT | Performed by: FAMILY MEDICINE

## 2024-09-04 PROCEDURE — 1160F RVW MEDS BY RX/DR IN RCRD: CPT | Performed by: FAMILY MEDICINE

## 2024-09-04 PROCEDURE — 4010F ACE/ARB THERAPY RXD/TAKEN: CPT | Performed by: FAMILY MEDICINE

## 2024-09-04 PROCEDURE — 3074F SYST BP LT 130 MM HG: CPT | Performed by: FAMILY MEDICINE

## 2024-09-04 PROCEDURE — 83036 HEMOGLOBIN GLYCOSYLATED A1C: CPT | Performed by: FAMILY MEDICINE

## 2024-09-04 ASSESSMENT — PATIENT HEALTH QUESTIONNAIRE - PHQ9
2. FEELING DOWN, DEPRESSED OR HOPELESS: NOT AT ALL
1. LITTLE INTEREST OR PLEASURE IN DOING THINGS: NOT AT ALL
SUM OF ALL RESPONSES TO PHQ9 QUESTIONS 1 AND 2: 0

## 2024-09-04 ASSESSMENT — ENCOUNTER SYMPTOMS
DIABETIC ASSOCIATED SYMPTOMS: 0
OCCASIONAL FEELINGS OF UNSTEADINESS: 0
DEPRESSION: 0
LOSS OF SENSATION IN FEET: 0

## 2024-09-04 NOTE — PATIENT INSTRUCTIONS
A1C 7.5 (7.4 last time).  Continue metformin 1000 mg twice a day and glipizide 5 mg,  2 pills with breakfast, and 1  pill with dinner.  Plan to follow up for recheck in about 3 months.  Work on dietary changes to get glucose done, such as stopping soda, reducing carbohydrates in diet.  If A1C  is still high, consider one of the new diabetic medications, such as Farxiga.     BP well controlled.  Continue lisinopril 20 mg and hydrochlorothiazide 25 mg daily.               For cholesterol, taking lovastatin.    Will plan to check fasting blood work next visit.

## 2024-09-04 NOTE — PROGRESS NOTES
"Subjective   Patient ID: Juan Alberto Salcido is a 74 y.o. male who presents for Follow-up (3 MONTH FOLLOW UP ) and Diabetes.  Last visit May 29  A1C up to 7.4.  increased glipizide to 2 tabs in AM, 1 in PM.  GFR 72  HDL 38, LDL 14, TC 75.    Glucose 140's first thing in AM  Doing better with soda, but still drinking more over the summer.  Felling well.  No other concerns.    Has CT coronary calcium score scheduled for next week.    Diabetes  He presents for his follow-up diabetic visit. He has type 2 diabetes mellitus. There are no hypoglycemic associated symptoms. There are no diabetic associated symptoms. There are no hypoglycemic complications.       Review of Systems    Objective   /79   Pulse 65   Temp 36.6 °C (97.8 °F)   Ht 1.854 m (6' 1\")   Wt 85.8 kg (189 lb 3.2 oz)   SpO2 97%   BMI 24.96 kg/m²     Physical Exam  Vitals reviewed.   Constitutional:       Appearance: Normal appearance.   Cardiovascular:      Rate and Rhythm: Normal rate and regular rhythm.      Heart sounds: No murmur heard.  Pulmonary:      Effort: Pulmonary effort is normal.      Breath sounds: Normal breath sounds.   Musculoskeletal:      Right lower leg: No edema.      Left lower leg: No edema.   Neurological:      Mental Status: He is alert.           Assessment/Plan   Problem List Items Addressed This Visit       Type 2 diabetes mellitus with hyperglycemia, without long-term current use of insulin (Multi) - Primary    Relevant Orders    POCT glycosylated hemoglobin (Hb A1C) manually resulted (Completed)    Essential (primary) hypertension    Hyperlipemia, mixed     Other Visit Diagnoses       Body mass index (BMI) of 24.0 to 24.9 in adult                   "

## 2024-09-12 ENCOUNTER — HOSPITAL ENCOUNTER (OUTPATIENT)
Dept: RADIOLOGY | Facility: CLINIC | Age: 74
Discharge: HOME | End: 2024-09-12
Payer: MEDICARE

## 2024-09-12 DIAGNOSIS — E11.65 TYPE 2 DIABETES MELLITUS WITH HYPERGLYCEMIA, WITHOUT LONG-TERM CURRENT USE OF INSULIN (MULTI): ICD-10-CM

## 2024-09-12 PROCEDURE — 75571 CT HRT W/O DYE W/CA TEST: CPT

## 2024-09-14 NOTE — RESULT ENCOUNTER NOTE
CT coronary calcium score was 81.2, which is still in the range indicating risk of heart attack is on the low side.  Taking the statin is also protective.

## 2024-11-10 DIAGNOSIS — E11.65 TYPE 2 DIABETES MELLITUS WITH HYPERGLYCEMIA, WITHOUT LONG-TERM CURRENT USE OF INSULIN: ICD-10-CM

## 2024-11-10 DIAGNOSIS — N52.9 MALE ERECTILE DISORDER OF ORGANIC ORIGIN: ICD-10-CM

## 2024-11-10 DIAGNOSIS — I10 ESSENTIAL (PRIMARY) HYPERTENSION: ICD-10-CM

## 2024-11-10 DIAGNOSIS — E78.2 HYPERLIPEMIA, MIXED: ICD-10-CM

## 2024-11-10 RX ORDER — LISINOPRIL 20 MG/1
20 TABLET ORAL DAILY
Qty: 90 TABLET | Refills: 0 | Status: SHIPPED | OUTPATIENT
Start: 2024-11-10

## 2024-11-10 RX ORDER — SILDENAFIL 100 MG/1
TABLET, FILM COATED ORAL
Qty: 12 TABLET | Refills: 0 | Status: SHIPPED | OUTPATIENT
Start: 2024-11-10

## 2024-11-10 RX ORDER — LOVASTATIN 20 MG/1
20 TABLET ORAL DAILY
Qty: 90 TABLET | Refills: 0 | Status: SHIPPED | OUTPATIENT
Start: 2024-11-10

## 2024-11-10 RX ORDER — METFORMIN HYDROCHLORIDE 1000 MG/1
1000 TABLET ORAL 2 TIMES DAILY
Qty: 180 TABLET | Refills: 0 | Status: SHIPPED | OUTPATIENT
Start: 2024-11-10

## 2024-11-24 DIAGNOSIS — E11.65 TYPE 2 DIABETES MELLITUS WITH HYPERGLYCEMIA, WITHOUT LONG-TERM CURRENT USE OF INSULIN: ICD-10-CM

## 2024-11-25 RX ORDER — GLIPIZIDE 5 MG/1
TABLET ORAL
Qty: 270 TABLET | Refills: 0 | Status: SHIPPED | OUTPATIENT
Start: 2024-11-25

## 2024-12-01 DIAGNOSIS — I10 ESSENTIAL (PRIMARY) HYPERTENSION: ICD-10-CM

## 2024-12-01 RX ORDER — ATENOLOL 50 MG/1
50 TABLET ORAL DAILY
Qty: 90 TABLET | Refills: 0 | Status: SHIPPED | OUTPATIENT
Start: 2024-12-01

## 2024-12-05 ENCOUNTER — APPOINTMENT (OUTPATIENT)
Dept: PRIMARY CARE | Facility: CLINIC | Age: 74
End: 2024-12-05
Payer: MEDICARE

## 2024-12-09 DIAGNOSIS — I10 ESSENTIAL (PRIMARY) HYPERTENSION: ICD-10-CM

## 2024-12-10 RX ORDER — HYDROCHLOROTHIAZIDE 25 MG/1
12.5 TABLET ORAL DAILY
Qty: 45 TABLET | Refills: 0 | Status: SHIPPED | OUTPATIENT
Start: 2024-12-10

## 2024-12-16 ENCOUNTER — APPOINTMENT (OUTPATIENT)
Dept: PRIMARY CARE | Facility: CLINIC | Age: 74
End: 2024-12-16
Payer: MEDICARE

## 2024-12-16 VITALS
DIASTOLIC BLOOD PRESSURE: 78 MMHG | TEMPERATURE: 97.1 F | HEART RATE: 78 BPM | BODY MASS INDEX: 25.31 KG/M2 | HEIGHT: 73 IN | WEIGHT: 191 LBS | SYSTOLIC BLOOD PRESSURE: 122 MMHG | OXYGEN SATURATION: 98 %

## 2024-12-16 DIAGNOSIS — E11.65 TYPE 2 DIABETES MELLITUS WITH HYPERGLYCEMIA, WITHOUT LONG-TERM CURRENT USE OF INSULIN: ICD-10-CM

## 2024-12-16 DIAGNOSIS — I10 ESSENTIAL (PRIMARY) HYPERTENSION: Primary | ICD-10-CM

## 2024-12-16 LAB — POC HEMOGLOBIN A1C: 7.6 % (ref 4.2–6.5)

## 2024-12-16 PROCEDURE — 4010F ACE/ARB THERAPY RXD/TAKEN: CPT | Performed by: FAMILY MEDICINE

## 2024-12-16 PROCEDURE — 99212 OFFICE O/P EST SF 10 MIN: CPT | Performed by: FAMILY MEDICINE

## 2024-12-16 PROCEDURE — 1159F MED LIST DOCD IN RCRD: CPT | Performed by: FAMILY MEDICINE

## 2024-12-16 PROCEDURE — 1036F TOBACCO NON-USER: CPT | Performed by: FAMILY MEDICINE

## 2024-12-16 PROCEDURE — 1160F RVW MEDS BY RX/DR IN RCRD: CPT | Performed by: FAMILY MEDICINE

## 2024-12-16 PROCEDURE — 3078F DIAST BP <80 MM HG: CPT | Performed by: FAMILY MEDICINE

## 2024-12-16 PROCEDURE — 83036 HEMOGLOBIN GLYCOSYLATED A1C: CPT | Performed by: FAMILY MEDICINE

## 2024-12-16 PROCEDURE — 3048F LDL-C <100 MG/DL: CPT | Performed by: FAMILY MEDICINE

## 2024-12-16 PROCEDURE — 3074F SYST BP LT 130 MM HG: CPT | Performed by: FAMILY MEDICINE

## 2024-12-16 PROCEDURE — 3008F BODY MASS INDEX DOCD: CPT | Performed by: FAMILY MEDICINE

## 2024-12-16 PROCEDURE — 1126F AMNT PAIN NOTED NONE PRSNT: CPT | Performed by: FAMILY MEDICINE

## 2024-12-16 PROCEDURE — 1123F ACP DISCUSS/DSCN MKR DOCD: CPT | Performed by: FAMILY MEDICINE

## 2024-12-16 ASSESSMENT — PAIN SCALES - GENERAL: PAINLEVEL_OUTOF10: 0-NO PAIN

## 2024-12-16 NOTE — PATIENT INSTRUCTIONS
A1C 7.6 today.  Continue metformin 1000 mg twice a day and glipizide 5 mg,  2 pills with breakfast, and 1  pill with dinner.  Will put referral in to  pharmacist to assist in getting a newer medication (such as farxiga), to help get the A1C down.  Work on dietary changes to get glucose done, such as stopping soda, reducing carbohydrates in diet.  Plan to follow up in 4 months, well after the holidays.    BP well controlled.  Continue lisinopril 20 mg and hydrochlorothiazide 25 mg daily.               For cholesterol, taking lovastatin.    Will plan to check fasting blood work next visit.

## 2024-12-16 NOTE — PROGRESS NOTES
"Subjective   Patient ID: Juan Alberto Salcido is a 74 y.o. male who presents for Diabetes Mellitus.  Here for follow up of diabetes.  Taking metformin and glipizide.  Has not been sticking with his diet, like he should, with the holidays.  Weight has gone up as well.  Drinking more sodas.    Is not opposed to trying newer medication, but thinks he has been on farxiga in the past and it was very expensive.        Review of Systems    Objective   /78 (BP Location: Right arm, Patient Position: Sitting, BP Cuff Size: Large adult)   Pulse 78   Temp 36.2 °C (97.1 °F) (Temporal)   Ht 1.854 m (6' 1\")   Wt 86.6 kg (191 lb)   SpO2 98%   BMI 25.20 kg/m²     Physical Exam  Vitals reviewed.   Constitutional:       Appearance: Normal appearance.   Cardiovascular:      Rate and Rhythm: Normal rate and regular rhythm.   Pulmonary:      Effort: Pulmonary effort is normal.      Breath sounds: Normal breath sounds.   Neurological:      Mental Status: He is alert.   Psychiatric:         Mood and Affect: Mood normal.         Behavior: Behavior normal.           Assessment/Plan   Problem List Items Addressed This Visit       Type 2 diabetes mellitus with hyperglycemia, without long-term current use of insulin    Relevant Orders    POCT glycosylated hemoglobin (Hb A1C) manually resulted (Completed)    Referral to Clinical Pharmacy    Essential (primary) hypertension - Primary          "

## 2024-12-19 NOTE — PROGRESS NOTES
Clinical Pharmacy Appointment    Patient ID: Juan Alberto Salcido is a 74 y.o. male who presents for Diabetes.    Pt is here for First appointment.     Referring Provider: Deborah Hartmann MD  PCP: Deborah Hartmann MD   Last visit with PCP: 12/16/2024  Next visit with PCP: 4/21/2025      Subjective   HPI  DIABETES MELLITUS TYPE 2:    Current diabetic medications include:  Metformin 1,000 mg; take 1 tablet BID  Glipizide 5 mg; take 2 tablets (10 mg) in the mornings and 1 tablet (5 mg) with dinner    Clarifications to above regimen: None  Adverse Effects: None    Past diabetic medications include:  Farxiga (cost)    Glucose Readings:  Glucometer/CGM Type: Glucometer  Patient tests BG 1-2 times per week    Current home BG readings: 140-150s mg/dL     Any episodes of hypoglycemia? No, patient has not reported any hypoglycemic episodes .  Did patient treat episode of hypoglycemia appropriately? N/A  Does the patient have a prescription for ready-to-use Glucagon? Not on insulin  Does pt have proteinuria? Yes    Lifestyle:  Diet: 2 meals/day.  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 Yes  ACE-I/ARB? Yes  Aspirin? Yes    Pertinent PMH Review:  PMH of Pancreatitis: No  PMH of Retinopathy: No  PMH of Urinary Tract Infections: No  PMH of MTC: No    Immunizations:  Influenza? No - counseled patient to get yearly flu shot  COVID? No (Most recent 3/3/2024, but has not gotten the most up to date booster)  Pneumonia? Yes  Shingles? No    Drug Interactions  No relevant drug interactions were noted.    Medication System Management  Patient's preferred pharmacy: [id number] in [place]  Adherence/Organization: No concerns at this time  Affordability/Accessibility: Will apply for  PAP for Farxiga      Objective   No Known Allergies  Social History     Social History Narrative    Not on file       Medication Review  Current Outpatient Medications   Medication Instructions    aspirin 81 mg, oral, Daily    atenolol (TENORMIN) 50 mg, oral, Daily    glipiZIDE (Glucotrol) 5 mg tablet TAKE TWO TABLETS BY MOUTH IN THE MORNING AND ONE TABLET WITH DINNER    hydroCHLOROthiazide (HYDRODIURIL) 12.5 mg, oral, Daily    latanoprost (Xalatan) 0.005 % ophthalmic solution Administer into affected eye(s). Entered during TW abstraction - no directions provided    lisinopril 20 mg, oral, Daily    lovastatin (MEVACOR) 20 mg, oral, Daily    metFORMIN (GLUCOPHAGE) 1,000 mg, oral, 2 times daily    sildenafil (Viagra) 100 mg tablet TAKE ONE TABLET BY MOUTH ONCE DAILY AS NEEDED FOR ERECTILE DYSFUNCTION    tadalafil (CIALIS) 20 mg, oral, Daily PRN    timolol (Timoptic) 0.5 % ophthalmic solution Administer into affected eye(s). Entered during TW abstraction - no directions provided      Vitals  BP Readings from Last 2 Encounters:   12/16/24 122/78   09/04/24 116/79     BMI Readings from Last 1 Encounters:   12/16/24 25.20 kg/m²      Labs  A1C  Lab Results   Component Value Date    HGBA1C 7.6 (A) 12/16/2024    HGBA1C 7.5 (A) 09/04/2024    HGBA1C 7.4 (A) 05/29/2024     BMP  Lab Results   Component Value Date    CALCIUM 9.7 02/28/2024     02/28/2024    K 4.6 02/28/2024    CO2 29 02/28/2024    CL 99 02/28/2024    BUN 22 02/28/2024    CREATININE 1.09 02/28/2024    EGFR 72 02/28/2024     LFTs  Lab Results   Component Value Date    ALT 18 07/18/2021    AST 18 07/18/2021    ALKPHOS 84 07/18/2021    BILITOT 0.8 07/18/2021     FLP  Lab Results   Component Value Date    TRIG 113 02/28/2024    CHOL 75 02/28/2024    LDLF 10 07/12/2023    LDLCALC 14 02/28/2024    HDL 38.3 02/28/2024     Urine Microalbumin  Lab Results   Component Value Date    MICROALBCREA 82.6 (H) 07/12/2023     Weight Management  Wt Readings from Last 3 Encounters:   12/16/24 86.6 kg (191 lb)   09/04/24 85.8 kg (189 lb 3.2 oz)   05/29/24 83.6 kg (184 lb 3.2 oz)      There is  no height or weight on file to calculate BMI.     Assessment/Plan   Problem List Items Addressed This Visit       Type 2 diabetes mellitus with hyperglycemia, without long-term current use of insulin     Patient's goal A1c is < 7.5%.  Is pt at goal? No, patient's most recent A1c from 12/16/2024 was 7.6%  Patient's SMBGs are elevated.     Rationale for plan: Patient has previously been on Farxiga 5 mg daily but noted that it was expensive.     Farxiga Education:    - Counseled patient on Farxiga MOA, expectations, side effects, duration of therapy, administration, and monitoring parameters.  - Reviewed the benefits of SGLT-2i therapy, such as glycemic control and kidney and CV protection.  - Advised patient to practice proper  hygiene to reduce risk of UTIs or yeast infections.  - Advised patient to maintain adequate fluid intake to remain hydrated while on SGLT2i therapy.  - Answered all patient questions and concerns.    Medication Changes:  CONTINUE  Metformin 1000 mg; take 1 tablet BID  Glipizide 5 mg; take 2 tablets (10 mg) in the morning and 1 tablet (5 mg) with dinner    Future Considerations:  Patient plans to apply for  PAP for Farxiga, will bring 1040 to Dr. Hartmann's office. If available, can use the free trial to start medication while waiting for  PAP to be approved    Monitoring and Education:  Counseled patient on medications, on MOA, dosing, adverse effects  Counseled patient on goals for blood sugar, such as FBG <130 mg/dL; PPBG <180 mg/dL; A1c <7.5%  Answered all patient questions or concerns            Clinical Pharmacist follow-up: 1/17/2025, Telehealth visit    Continue all meds under the continuation of care with the referring provider and clinical pharmacy team.    Thank you,  Daily Watson, PharmD  Clinical Pharmacist - Primary Care  [phone]      Verbal consent to manage patient's drug therapy was obtained from the patient. They were informed they may decline to participate or  withdraw from participation in pharmacy services at any time.

## 2024-12-20 ENCOUNTER — TELEMEDICINE (OUTPATIENT)
Dept: PHARMACY | Facility: HOSPITAL | Age: 74
End: 2024-12-20
Payer: MEDICARE

## 2024-12-20 DIAGNOSIS — E11.65 TYPE 2 DIABETES MELLITUS WITH HYPERGLYCEMIA, WITHOUT LONG-TERM CURRENT USE OF INSULIN: ICD-10-CM

## 2024-12-20 RX ORDER — ASPIRIN 81 MG/1
81 TABLET ORAL DAILY
COMMUNITY

## 2024-12-20 NOTE — ASSESSMENT & PLAN NOTE
Patient's goal A1c is < 7.5%.  Is pt at goal? No, patient's most recent A1c from 12/16/2024 was 7.6%  Patient's SMBGs are elevated.     Rationale for plan: Patient has previously been on Farxiga 5 mg daily but noted that it was expensive.     Farxiga Education:    - Counseled patient on Farxiga MOA, expectations, side effects, duration of therapy, administration, and monitoring parameters.  - Reviewed the benefits of SGLT-2i therapy, such as glycemic control and kidney and CV protection.  - Advised patient to practice proper  hygiene to reduce risk of UTIs or yeast infections.  - Advised patient to maintain adequate fluid intake to remain hydrated while on SGLT2i therapy.  - Answered all patient questions and concerns.    Medication Changes:  CONTINUE  Metformin 1000 mg; take 1 tablet BID  Glipizide 5 mg; take 2 tablets (10 mg) in the morning and 1 tablet (5 mg) with dinner    Future Considerations:  Patient plans to apply for  PAP for Farxiga, will bring 1040 to Dr. Hartmann's office. If available, can use the free trial to start medication while waiting for  PAP to be approved    Monitoring and Education:  Counseled patient on medications, on MOA, dosing, adverse effects  Counseled patient on goals for blood sugar, such as FBG <130 mg/dL; PPBG <180 mg/dL; A1c <7.5%  Answered all patient questions or concerns

## 2025-01-17 ENCOUNTER — APPOINTMENT (OUTPATIENT)
Dept: PHARMACY | Facility: HOSPITAL | Age: 75
End: 2025-01-17
Payer: MEDICARE

## 2025-01-20 NOTE — PROGRESS NOTES
Clinical Pharmacy Appointment    Patient ID: Juan Alberto Salcido is a 74 y.o. male who presents for Diabetes.    Pt is here for Follow Up appointment.     Referring Provider: Deborah Hartmann MD  PCP: Deborah Hartmann MD   Last visit with PCP: 12/16/2024   Next visit with PCP: 4/21/2025      Subjective   HPI  DIABETES MELLITUS TYPE 2:    Current diabetic medications include:  Metformin 1000 mg; take 1 tablet BID  Glipizide 5 mg; take 2 tablets (10 mg) in the mornings and 1 tablet (5 mg) with dinner    Clarifications to above regimen: None  Adverse Effects: None    Past diabetic medications include:  Farxiga (cost)    Glucose Readings:  Glucometer/CGM Type: Glucometer  Patient tests BG 1-2 times per week    Current home BG readings (mg/dL): 140-150 mg/dL   Previous home BG readings (mg/dL): 140-150s mg/dL    Any episodes of hypoglycemia? No, no episodes of hypoglycemia .  Did patient treat episode of hypoglycemia appropriately? N/A  Does the patient have a prescription for ready-to-use Glucagon? Not on insulin  Does pt have proteinuria? Yes    Lifestyle:  Diet: 2 meals/day.   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 Yes  ACE-I/ARB? Yes  Aspirin? Yes    Pertinent PMH Review:  PMH of Pancreatitis: No  PMH of Retinopathy: No  PMH of Urinary Tract Infections: No  PMH of MTC: No    Drug Interactions  No relevant drug interactions were noted.    Medication System Management  Patient's preferred pharmacy: [id number] in [place]  Adherence/Organization: No concerns at this time  Affordability/Accessibility: Will apply for [facility] PAP for Farxiga      Objective   No Known Allergies  Social History     Social History Narrative    Not on file      Medication Review  Current Outpatient Medications   Medication Instructions    aspirin 81 mg, oral, Daily    atenolol (TENORMIN) 50 mg, oral, Daily     glipiZIDE (Glucotrol) 5 mg tablet TAKE TWO TABLETS BY MOUTH IN THE MORNING AND ONE TABLET WITH DINNER    hydroCHLOROthiazide (HYDRODIURIL) 12.5 mg, oral, Daily    latanoprost (Xalatan) 0.005 % ophthalmic solution Administer into affected eye(s). Entered during TW abstraction - no directions provided    lisinopril 20 mg, oral, Daily    lovastatin (MEVACOR) 20 mg, oral, Daily    metFORMIN (GLUCOPHAGE) 1,000 mg, oral, 2 times daily    sildenafil (Viagra) 100 mg tablet TAKE ONE TABLET BY MOUTH ONCE DAILY AS NEEDED FOR ERECTILE DYSFUNCTION    tadalafil (CIALIS) 20 mg, oral, Daily PRN    timolol (Timoptic) 0.5 % ophthalmic solution Administer into affected eye(s). Entered during TW abstraction - no directions provided      Vitals  BP Readings from Last 2 Encounters:   12/16/24 122/78   09/04/24 116/79     BMI Readings from Last 1 Encounters:   12/16/24 25.20 kg/m²      Labs  A1C  Lab Results   Component Value Date    HGBA1C 7.6 (A) 12/16/2024    HGBA1C 7.5 (A) 09/04/2024    HGBA1C 7.4 (A) 05/29/2024     BMP  Lab Results   Component Value Date    CALCIUM 9.7 02/28/2024     02/28/2024    K 4.6 02/28/2024    CO2 29 02/28/2024    CL 99 02/28/2024    BUN 22 02/28/2024    CREATININE 1.09 02/28/2024    EGFR 72 02/28/2024     LFTs  Lab Results   Component Value Date    ALT 18 07/18/2021    AST 18 07/18/2021    ALKPHOS 84 07/18/2021    BILITOT 0.8 07/18/2021     FLP  Lab Results   Component Value Date    TRIG 113 02/28/2024    CHOL 75 02/28/2024    LDLF 10 07/12/2023    LDLCALC 14 02/28/2024    HDL 38.3 02/28/2024     Urine Microalbumin  Lab Results   Component Value Date    MICROALBCREA 82.6 (H) 07/12/2023     Weight Management  Wt Readings from Last 3 Encounters:   12/16/24 86.6 kg (191 lb)   09/04/24 85.8 kg (189 lb 3.2 oz)   05/29/24 83.6 kg (184 lb 3.2 oz)      There is no height or weight on file to calculate BMI.     Assessment/Plan   Problem List Items Addressed This Visit       Type 2 diabetes mellitus with  hyperglycemia, without long-term current use of insulin     Patient's goal A1c is < 7.5%.  Is pt at goal? No, patient's most recent A1c from 12/16/2024 was 7.6%  Patient's SMBGs are slightly elevated, his FBG have still been ranging from 140s-150s mg/dL.     Rationale for plan: Patient still needs to bring his 1040 tax information to apply for  PAP to restart Farxiga.    Medication Changes:  CONTINUE  Metformin 1000 mg; take 1 tablet BID  Glipizide 5 mg; take 2 tablets (10 mg) in mornings, and 1 tablet (5 mg) with dinner    Future Considerations:  When patient starts on the Farxiga again, he may need to decrease dose of glipizide to prevent hypoglycemia.    Monitoring and Education:  Counseled patient on the goals of treatment including FBG: <130 mg/dL; PPBG <180 mg/dL; A1c <7.5%            Clinical Pharmacist follow-up: 2/12/2025, Telehealth visit    Continue all meds under the continuation of care with the referring provider and clinical pharmacy team.    Thank you,  Daily Watson, PharmD  Clinical Pharmacist - Primary Care  [phone]  1/21/2025    Verbal consent to manage patient's drug therapy was obtained from the patient. They were informed they may decline to participate or withdraw from participation in pharmacy services at any time.

## 2025-01-21 ENCOUNTER — APPOINTMENT (OUTPATIENT)
Dept: PHARMACY | Facility: HOSPITAL | Age: 75
End: 2025-01-21
Payer: MEDICARE

## 2025-01-21 DIAGNOSIS — E11.65 TYPE 2 DIABETES MELLITUS WITH HYPERGLYCEMIA, WITHOUT LONG-TERM CURRENT USE OF INSULIN: ICD-10-CM

## 2025-01-21 NOTE — ASSESSMENT & PLAN NOTE
Patient's goal A1c is < 7.5%.  Is pt at goal? No, patient's most recent A1c from 12/16/2024 was 7.6%  Patient's SMBGs are slightly elevated, his FBG have still been ranging from 140s-150s mg/dL.     Rationale for plan: Patient still needs to bring his 1040 tax information to apply for  PAP to restart Farxiga.    Medication Changes:  CONTINUE  Metformin 1000 mg; take 1 tablet BID  Glipizide 5 mg; take 2 tablets (10 mg) in mornings, and 1 tablet (5 mg) with dinner    Future Considerations:  When patient starts on the Farxiga again, he may need to decrease dose of glipizide to prevent hypoglycemia.    Monitoring and Education:  Counseled patient on the goals of treatment including FBG: <130 mg/dL; PPBG <180 mg/dL; A1c <7.5%

## 2025-01-26 DIAGNOSIS — N52.9 MALE ERECTILE DISORDER OF ORGANIC ORIGIN: ICD-10-CM

## 2025-01-27 RX ORDER — SILDENAFIL 100 MG/1
TABLET, FILM COATED ORAL
Qty: 12 TABLET | Refills: 0 | Status: SHIPPED | OUTPATIENT
Start: 2025-01-27

## 2025-02-10 DIAGNOSIS — E11.65 TYPE 2 DIABETES MELLITUS WITH HYPERGLYCEMIA, WITHOUT LONG-TERM CURRENT USE OF INSULIN: ICD-10-CM

## 2025-02-10 DIAGNOSIS — E78.2 HYPERLIPEMIA, MIXED: ICD-10-CM

## 2025-02-10 DIAGNOSIS — I10 ESSENTIAL (PRIMARY) HYPERTENSION: ICD-10-CM

## 2025-02-10 RX ORDER — METFORMIN HYDROCHLORIDE 1000 MG/1
1000 TABLET ORAL 2 TIMES DAILY
Qty: 180 TABLET | Refills: 0 | Status: SHIPPED | OUTPATIENT
Start: 2025-02-10

## 2025-02-10 RX ORDER — LOVASTATIN 20 MG/1
20 TABLET ORAL DAILY
Qty: 90 TABLET | Refills: 0 | Status: SHIPPED | OUTPATIENT
Start: 2025-02-10

## 2025-02-10 RX ORDER — LISINOPRIL 20 MG/1
20 TABLET ORAL DAILY
Qty: 90 TABLET | Refills: 0 | Status: SHIPPED | OUTPATIENT
Start: 2025-02-10

## 2025-02-13 ENCOUNTER — APPOINTMENT (OUTPATIENT)
Dept: PHARMACY | Facility: HOSPITAL | Age: 75
End: 2025-02-13
Payer: MEDICARE

## 2025-02-13 DIAGNOSIS — E11.65 TYPE 2 DIABETES MELLITUS WITH HYPERGLYCEMIA, WITHOUT LONG-TERM CURRENT USE OF INSULIN: ICD-10-CM

## 2025-02-13 NOTE — Clinical Note
For your awareness. Please reach out to me with any questions or concerns.
Shahla Curtis, PA
Shahla Curtis, PA

## 2025-02-13 NOTE — PROGRESS NOTES
Clinical Pharmacy Appointment    Patient ID: Juan Alberto Salcido is a 74 y.o. male who presents for Diabetes.    Pt is here for Follow Up appointment.     Referring Provider: Deborah Hartmann MD  PCP: Deborah Hartmann MD   Last visit with PCP: 12/16/2024   Next visit with PCP: 4/21/2025      Subjective   HPI  DIABETES MELLITUS TYPE 2:    Current diabetic medications include:  Metformin 1000 mg; take 1 tablet BID  Glipizide 5 mg; take 2 tablets (10 mg) in the mornings and 1 tablet (5 mg) with dinner    Clarifications to above regimen: None  Adverse Effects: None    Past diabetic medications include:  Farxiga (cost)    Glucose Readings:  Glucometer/CGM Type: Glucometer  Patient tests BG 1-2 times per week    Current home BG readings (mg/dL): 150s-160s mg/dL  Previous home BG readings (mg/dL): 140-150s mg/dL    Any episodes of hypoglycemia? No, no episodes of hypoglycemia .  Did patient treat episode of hypoglycemia appropriately? N/A  Does the patient have a prescription for ready-to-use Glucagon? Not on insulin    Lifestyle:  Diet: 2 meals/day.  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 Yes  ACE-I/ARB? Yes  Aspirin? Yes    Pertinent PMH Review:  PMH of Pancreatitis: No  PMH of Retinopathy: No  PMH of Urinary Tract Infections: No  PMH of MTC: No  PMH of MEN2: No  UACR/EGFR in last year?: No  Albumin/Creatine Ratio   Date Value Ref Range Status   07/12/2023 82.6 (H) 0.0 - 30.0 ug/mg crt Final   07/20/2022 52.3 (H) 0.0 - 30.0 ug/mg crt Final   06/11/2021 143.8 (H) 0.0 - 30.0 ug/mg crt Final     Drug Interactions  No relevant drug interactions were noted.    Medication System Management  Patient's preferred pharmacy: [id number] in [place]  Adherence/Organization: No concerns at this time  Affordability/Accessibility: Will apply for [facility] PAP for Farxiga  ?      Objective   No Known Allergies  Social History     Social History Narrative    Not on file      Medication Review  Current Outpatient Medications   Medication Instructions    aspirin 81 mg, oral, Daily    atenolol (TENORMIN) 50 mg, oral, Daily    glipiZIDE (Glucotrol) 5 mg tablet TAKE TWO TABLETS BY MOUTH IN THE MORNING AND ONE TABLET WITH DINNER    hydroCHLOROthiazide (HYDRODIURIL) 12.5 mg, oral, Daily    latanoprost (Xalatan) 0.005 % ophthalmic solution Administer into affected eye(s). Entered during TW abstraction - no directions provided    lisinopril 20 mg, oral, Daily    lovastatin (MEVACOR) 20 mg, oral, Daily    metFORMIN (GLUCOPHAGE) 1,000 mg, oral, 2 times daily    sildenafil (Viagra) 100 mg tablet TAKE ONE TABLET BY MOUTH once daily as needed for erectile dysfunction    tadalafil (CIALIS) 20 mg, oral, Daily PRN    timolol (Timoptic) 0.5 % ophthalmic solution Administer into affected eye(s). Entered during TW abstraction - no directions provided      Vitals  BP Readings from Last 2 Encounters:   12/16/24 122/78   09/04/24 116/79     BMI Readings from Last 1 Encounters:   12/16/24 25.20 kg/m²      Labs  A1C  Lab Results   Component Value Date    HGBA1C 7.6 (A) 12/16/2024    HGBA1C 7.5 (A) 09/04/2024    HGBA1C 7.4 (A) 05/29/2024     BMP  Lab Results   Component Value Date    CALCIUM 9.7 02/28/2024     02/28/2024    K 4.6 02/28/2024    CO2 29 02/28/2024    CL 99 02/28/2024    BUN 22 02/28/2024    CREATININE 1.09 02/28/2024    EGFR 72 02/28/2024     LFTs  Lab Results   Component Value Date    ALT 18 07/18/2021    AST 18 07/18/2021    ALKPHOS 84 07/18/2021    BILITOT 0.8 07/18/2021     FLP  Lab Results   Component Value Date    TRIG 113 02/28/2024    CHOL 75 02/28/2024    LDLF 10 07/12/2023    LDLCALC 14 02/28/2024    HDL 38.3 02/28/2024     Urine Microalbumin  Lab Results   Component Value Date    MICROALBCREA 82.6 (H) 07/12/2023     Weight Management  Wt Readings from Last 3 Encounters:   12/16/24  86.6 kg (191 lb)   09/04/24 85.8 kg (189 lb 3.2 oz)   05/29/24 83.6 kg (184 lb 3.2 oz)      There is no height or weight on file to calculate BMI.     Assessment/Plan   Problem List Items Addressed This Visit       Type 2 diabetes mellitus with hyperglycemia, without long-term current use of insulin     Patient's goal A1c is < 7.5%.  Is pt at goal? No, patient's most recent A1c from 12/16/2024 was 7.6%  Patient's SMBGs are still slightly elevated.     Rationale for plan: Patient will bring his 1040 to Dr. Hartmann's office tomorrow (2/14/2025) so he can apply for  PAP for Farxiga.    Medication Changes:  CONTINUE  Metformin 1000 mg; take 1 tablet BID  Glipizide 5 mg; take 2 tablets (10 mg) in the mornings, and 1 tablet (5 mg) with dinner)    Future Considerations:  When patient restarts the Farxiga again, he may need to decrease the dose of glipizide to prevent hypoglycemia    Monitoring and Education:   Counseled patient on the goals of treatment including FBG: <130 mg/dL; PPBG: <180 mg/dL; A1c: <7.5%            Clinical Pharmacist follow-up: 3/6/2025, Telehealth visit    Continue all meds under the continuation of care with the referring provider and clinical pharmacy team.    Thank you,  Daily Watson, PharmD  Clinical Pharmacist - Primary Care  [phone]  2/13/2025    Verbal consent to manage patient's drug therapy was obtained from the patient. They were informed they may decline to participate or withdraw from participation in pharmacy services at any time.

## 2025-02-13 NOTE — ASSESSMENT & PLAN NOTE
Patient's goal A1c is < 7.5%.  Is pt at goal? No, patient's most recent A1c from 12/16/2024 was 7.6%  Patient's SMBGs are still slightly elevated.     Rationale for plan: Patient will bring his 1040 to Dr. Hartmann's office tomorrow (2/14/2025) so he can apply for  PAP for Farxiga.    Medication Changes:  CONTINUE  Metformin 1000 mg; take 1 tablet BID  Glipizide 5 mg; take 2 tablets (10 mg) in the mornings, and 1 tablet (5 mg) with dinner)    Future Considerations:  When patient restarts the Farxiga again, he may need to decrease the dose of glipizide to prevent hypoglycemia    Monitoring and Education:   Counseled patient on the goals of treatment including FBG: <130 mg/dL; PPBG: <180 mg/dL; A1c: <7.5%

## 2025-02-17 PROCEDURE — RXMED WILLOW AMBULATORY MEDICATION CHARGE

## 2025-02-19 ENCOUNTER — PHARMACY VISIT (OUTPATIENT)
Dept: PHARMACY | Facility: CLINIC | Age: 75
End: 2025-02-19
Payer: MEDICARE

## 2025-02-21 ENCOUNTER — TELEPHONE (OUTPATIENT)
Dept: PHARMACY | Facility: HOSPITAL | Age: 75
End: 2025-02-21
Payer: MEDICARE

## 2025-02-21 NOTE — TELEPHONE ENCOUNTER
Patient Assistance Program Approval:     We are pleased to inform you that your application for assistance has been approved.     This approval is valid through February 21, 2026  as long as the following criteria continue to be satisfied:     Your medication (Farxiga) remains covered under your current insurance plan.   Your prescriber does not discontinue therapy.   You do not seek reimbursement from any other private or government-funded programs for the  medication.    Under this program, the pharmacy will first bill your insurance plan for your indemnified specified medication. The [employer] will then offset your copay balance, so that your out-of pocket expense for your specialty medication will be $0.00.    Daily Watson, PharmD

## 2025-02-21 NOTE — TELEPHONE ENCOUNTER
Patient returned call. Informed patient that Farxiga was approved through  PAP.    Patient Assistance Program Approval:     We are pleased to inform you that your application for assistance has been approved.     This approval is valid through February 21, 2026  as long as the following criteria continue to be satisfied:     Your medication (Farxiga) remains covered under your current insurance plan.   Your prescriber does not discontinue therapy.   You do not seek reimbursement from any other private or government-funded programs for the  medication.    Under this program, the pharmacy will first bill your insurance plan for your indemnified specified medication. The [employer] will then offset your copay balance, so that your out-of pocket expense for your specialty medication will be $0.00.    Daily Watson, NadiaD

## 2025-02-24 DIAGNOSIS — E11.65 TYPE 2 DIABETES MELLITUS WITH HYPERGLYCEMIA, WITHOUT LONG-TERM CURRENT USE OF INSULIN: ICD-10-CM

## 2025-02-24 RX ORDER — GLIPIZIDE 5 MG/1
TABLET ORAL
Qty: 270 TABLET | Refills: 0 | Status: SHIPPED | OUTPATIENT
Start: 2025-02-24

## 2025-03-02 DIAGNOSIS — I10 ESSENTIAL (PRIMARY) HYPERTENSION: ICD-10-CM

## 2025-03-03 RX ORDER — ATENOLOL 50 MG/1
50 TABLET ORAL DAILY
Qty: 90 TABLET | Refills: 0 | Status: SHIPPED | OUTPATIENT
Start: 2025-03-03

## 2025-03-06 ENCOUNTER — APPOINTMENT (OUTPATIENT)
Dept: PHARMACY | Facility: HOSPITAL | Age: 75
End: 2025-03-06
Payer: MEDICARE

## 2025-03-06 DIAGNOSIS — E11.65 TYPE 2 DIABETES MELLITUS WITH HYPERGLYCEMIA, WITHOUT LONG-TERM CURRENT USE OF INSULIN: ICD-10-CM

## 2025-03-06 NOTE — ASSESSMENT & PLAN NOTE
Patient's goal A1c is < 7.5%.  Is pt at goal? No, patient's most recent A1c from 12/16/2024 was 7.6%  Patient's SMBGs are coming closer to goal, FBG averaging between 140-150 mg/dL.     Rationale for plan: Patient's blood sugars are still slightly elevated, they are improving from the last appointment. Patient is currently only taking 5 mg of Farxiga, will increase to 10 mg daily. Patient will start to double up to use up the supply he has at home.    Medication Changes:  CONTINUE  Metformin 1000 mg; take 1 tablet BID  Glipizide 5 mg; take 2 tablets (10 mg) in the mornings, and 1 tablet (5 mg) with dinner)  INCREASE  Farxiga 10 mg; take 1 tablet daily    Future Considerations:  If patient starts to experience hypoglycemia, can decrease glipizide    Monitoring and Education:  Counseled patient on the goals of treatment including (FBG: <130 mg/dL; PPBG: <180 mg/dL; A1c <7.5%)  Answered all patient questions and concerns

## 2025-03-06 NOTE — PROGRESS NOTES
Clinical Pharmacy Appointment    Patient ID: Juan Alberto Salcido is a 74 y.o. male who presents for Diabetes.    Pt is here for Follow Up appointment.     Referring Provider: Deborah Hartmann MD  PCP: Deborah Hartmann MD   Last visit with PCP: 12/16/2024   Next visit with PCP: 4/21/2025    Subjective   HPI  DIABETES MELLITUS TYPE 2:    Does patient follow with Endocrinology: No  Last optometry exam: December 2024  Most recent visit in Podiatry: checks feet regularly-- patient denies sores or cuts on feet today      Current diabetic medications include:  Metformin 1000 mg; take 1 tablet BID  Glipizide 5 mg; take 2 tablets (10 mg) in the mornings and 1 tablet (5 mg) with dinner  Farxiga 5 mg; take 1 tablet daily    Clarifications to above regimen: None  Adverse Effects: increased urination    Glucose Readings:  Glucometer/CGM Type: Glucometer  Patient tests BG 1-2 times per week    Current home BG readings (mg/dL): 140-150 mg/dL   Previous home BG readings (mg/dL): 150s-160s mg/dL    Any episodes of hypoglycemia? No, no episodes of hypoglycemia .  Did patient treat episode of hypoglycemia appropriately? N/A  Does the patient have a prescription for ready-to-use Glucagon? Not on insulin    Lifestyle:  Diet: 2 meals/day.   BK: Cereal  DN: Fish, chicken, steak  Snacks: cookies  Drinks: Water, soda, sometimes coffee  Exercise: patient works a physical job  Tobacco history: Non-smoker    Secondary Prevention:  Statin? Yes  ACE-I/ARB? Yes  Aspirin? Yes    Pertinent PMH Review:  PMH of Pancreatitis: No  PMH of Retinopathy: No  PMH of Urinary Tract Infections: No  PMH of MTC: No  PMH of MEN2: No  UACR/EGFR in last year?: No  Albumin/Creatine Ratio   Date Value Ref Range Status   07/12/2023 82.6 (H) 0.0 - 30.0 ug/mg crt Final   07/20/2022 52.3 (H) 0.0 - 30.0 ug/mg crt Final   06/11/2021 143.8 (H) 0.0 - 30.0 ug/mg crt Final     Drug Interactions  No relevant drug interactions were noted.    Medication System Management  Patient's  preferred pharmacy:  [id number] in [place]  Adherence/Organization: No concerns at this time  Affordability/Accessibility:  PAP approved for Farxiga through 2/21/2026      Objective   No Known Allergies  Social History     Social History Narrative    Not on file      Medication Review  Current Outpatient Medications   Medication Instructions    aspirin 81 mg, oral, Daily    atenolol (TENORMIN) 50 mg, oral, Daily    Farxiga 5 mg, oral, Daily    glipiZIDE (Glucotrol) 5 mg tablet TAKE TWO TABLETS BY MOUTH IN THE MORNING AND ONE TABLET WITH DINNER    hydroCHLOROthiazide (HYDRODIURIL) 12.5 mg, oral, Daily    latanoprost (Xalatan) 0.005 % ophthalmic solution Administer into affected eye(s). Entered during TW abstraction - no directions provided    lisinopril 20 mg, oral, Daily    lovastatin (MEVACOR) 20 mg, oral, Daily    metFORMIN (GLUCOPHAGE) 1,000 mg, oral, 2 times daily    sildenafil (Viagra) 100 mg tablet TAKE ONE TABLET BY MOUTH once daily as needed for erectile dysfunction    tadalafil (CIALIS) 20 mg, oral, Daily PRN    timolol (Timoptic) 0.5 % ophthalmic solution Administer into affected eye(s). Entered during TW abstraction - no directions provided      Vitals  BP Readings from Last 2 Encounters:   12/16/24 122/78   09/04/24 116/79     BMI Readings from Last 1 Encounters:   12/16/24 25.20 kg/m²      Labs  A1C  Lab Results   Component Value Date    HGBA1C 7.6 (A) 12/16/2024    HGBA1C 7.5 (A) 09/04/2024    HGBA1C 7.4 (A) 05/29/2024     BMP  Lab Results   Component Value Date    CALCIUM 9.7 02/28/2024     02/28/2024    K 4.6 02/28/2024    CO2 29 02/28/2024    CL 99 02/28/2024    BUN 22 02/28/2024    CREATININE 1.09 02/28/2024    EGFR 72 02/28/2024     LFTs  Lab Results   Component Value Date    ALT 18 07/18/2021    AST 18 07/18/2021    ALKPHOS 84 07/18/2021    BILITOT 0.8 07/18/2021     FLP  Lab Results   Component Value Date    TRIG 113 02/28/2024    CHOL 75 02/28/2024    LDLF 10  07/12/2023    LDLCALC 14 02/28/2024    HDL 38.3 02/28/2024     Urine Microalbumin  Lab Results   Component Value Date    MICROALBCREA 82.6 (H) 07/12/2023     Weight Management  Wt Readings from Last 3 Encounters:   12/16/24 86.6 kg (191 lb)   09/04/24 85.8 kg (189 lb 3.2 oz)   05/29/24 83.6 kg (184 lb 3.2 oz)      There is no height or weight on file to calculate BMI.     Assessment/Plan   Problem List Items Addressed This Visit       Type 2 diabetes mellitus with hyperglycemia, without long-term current use of insulin     Patient's goal A1c is < 7.5%.  Is pt at goal? No, patient's most recent A1c from 12/16/2024 was 7.6%  Patient's SMBGs are coming closer to goal, FBG averaging between 140-150 mg/dL.     Rationale for plan: Patient's blood sugars are still slightly elevated, they are improving from the last appointment. Patient is currently only taking 5 mg of Farxiga, will increase to 10 mg daily. Patient will start to double up to use up the supply he has at home.    Medication Changes:  CONTINUE  Metformin 1000 mg; take 1 tablet BID  Glipizide 5 mg; take 2 tablets (10 mg) in the mornings, and 1 tablet (5 mg) with dinner)  INCREASE  Farxiga 10 mg; take 1 tablet daily    Future Considerations:  If patient starts to experience hypoglycemia, can decrease glipizide    Monitoring and Education:  Counseled patient on the goals of treatment including (FBG: <130 mg/dL; PPBG: <180 mg/dL; A1c <7.5%)  Answered all patient questions and concerns         Relevant Orders    Referral to Clinical Pharmacy       Clinical Pharmacist follow-up: 4/3/2025, Telehealth visit    Patient is not followed in [facility]. (If yes, track minutes under compass joni at each visit)    Continue all meds under the continuation of care with the referring provider and clinical pharmacy team.    Thank you,  Daily Watson, PharmD  Clinical Pharmacist - Primary Care  [phone]  3/6/2025    Verbal consent to manage patient's drug therapy was obtained from  the patient. They were informed they may decline to participate or withdraw from participation in pharmacy services at any time.

## 2025-03-16 DIAGNOSIS — I10 ESSENTIAL (PRIMARY) HYPERTENSION: ICD-10-CM

## 2025-03-17 RX ORDER — HYDROCHLOROTHIAZIDE 25 MG/1
TABLET ORAL DAILY
Qty: 45 TABLET | Refills: 0 | Status: SHIPPED | OUTPATIENT
Start: 2025-03-17

## 2025-03-31 DIAGNOSIS — N52.9 MALE ERECTILE DISORDER OF ORGANIC ORIGIN: ICD-10-CM

## 2025-03-31 RX ORDER — SILDENAFIL 100 MG/1
TABLET, FILM COATED ORAL
Qty: 12 TABLET | Refills: 0 | Status: SHIPPED | OUTPATIENT
Start: 2025-03-31

## 2025-04-03 ENCOUNTER — APPOINTMENT (OUTPATIENT)
Dept: PHARMACY | Facility: HOSPITAL | Age: 75
End: 2025-04-03
Payer: MEDICARE

## 2025-04-03 DIAGNOSIS — E11.65 TYPE 2 DIABETES MELLITUS WITH HYPERGLYCEMIA, WITHOUT LONG-TERM CURRENT USE OF INSULIN: ICD-10-CM

## 2025-04-03 PROCEDURE — RXMED WILLOW AMBULATORY MEDICATION CHARGE

## 2025-04-03 RX ORDER — DAPAGLIFLOZIN 10 MG/1
10 TABLET, FILM COATED ORAL DAILY
Qty: 90 TABLET | Refills: 3 | Status: SHIPPED | OUTPATIENT
Start: 2025-04-03 | End: 2026-05-08

## 2025-04-03 NOTE — ASSESSMENT & PLAN NOTE
Patient's goal A1c is < 7.5%.  Is pt at goal? No, patient's most recent A1c from 12/16/2024   Patient's SMBGs are still slightly elevated. Patient is interested in starting another medication to help with his blood sugars.     Rationale for plan: Since patient's FBG is still elevated, patient will start Januvia since he does not want to do an injectable medication.    Januvia (sitagliptin) Education:  Counseled patient on Januvia (sitagliptin) MOA, expectations, side effects, duration of therapy, administration, and monitoring parameters.  Counseled patient on the benefits of Januvia (sitagliptin), such as glycemic control and weight loss potential.  Provided detailed dosing and administration counseling  Reviewed storage requirements of Januvia (sitagliptin) and when to administer the medication if a dose is missed.  Discussed monitoring for pancreatitis (abdominal pain, N/V, jaundice) and heart failure exacerbation  All questions and concerns addressed.     Medication Changes:  CONTINUE  Metformin 1000 mg; take 1 tablet BID  Glipizide 5 mg; take 2 tablets (10 mg) in the mornings and 1 tablet (5 mg) with dinner  Farxiga 10 mg; take 1 tablet daily  START  Januvia 100 mg; take 1 tablet daily    Future Considerations:  If patient starts to have low blood sugars, can decrease glipizide    Monitoring and Education:  Counseled patient on medications  Counseled patient on goals of therapy  Answered all patient questions and concerns

## 2025-04-03 NOTE — PROGRESS NOTES
Clinical Pharmacy Appointment    Patient ID: Juan Alberto Salcido is a 74 y.o. male who presents for Diabetes.    Pt is here for Follow Up appointment.     Referring Provider: Deborah Hartmann MD  PCP: Deborah Hartmann MD   Last visit with PCP: 12/16/2024   Next visit with PCP: 4/21/2025    Subjective   Drug Interactions  No relevant drug interactions were noted.    Medication System Management  Patient's preferred pharmacy:  [id number] in [place]  Adherence/Organization: No concerns at this time  Affordability/Accessibility:  PAP approved for Farxiga through 2/21/2026      HPI  DIABETES MELLITUS TYPE 2:    Does patient follow with Endocrinology: No  Last optometry exam: December 2024  Most recent visit in Podiatry: checks feet regularly -- patient denies sores or cuts on feet today      Current diabetic medications include:  Metformin 1000 mg; take 1 tablet BID  Glipizide 5 mg; take 2 tablets (10 mg) in the mornings and 1 tablet (5 mg) with dinner  Farxiga 10 mg; take 1 tablet daily    Clarifications to above regimen: None  Adverse Effects: None    Glucose Readings:  Glucometer/CGM Type: Glucometer  Patient tests BG 1-2 times per week    Current home BG readings (mg/dL): 140s   Previous home BG readings (mg/dL): 140-150 mg/dL    Any episodes of hypoglycemia? No, patient has not been experiencing any low blood sugars .  Did patient treat episode of hypoglycemia appropriately? N/A  Does the patient have a prescription for ready-to-use Glucagon? Not on insulin    Lifestyle:  Diet: 2 meals/day.   BK: Cereal  DN: Fish, chicken, steak  Snacks: cookies  Drinks: Water, soda, sometimes coffee  Exercise: patient works a physical job  Tobacco history: Non-smoker    Secondary Prevention:  Statin? Yes  ACE-I/ARB? Yes  Aspirin? Yes    Pertinent PMH Review:  PMH of Pancreatitis: No  PMH of Retinopathy: No  PMH of Urinary Tract Infections: No  PMH of MTC: No  PMH of MEN2: No  UACR/EGFR in last year?:  No  Albumin/Creatine Ratio   Date Value Ref Range Status   07/12/2023 82.6 (H) 0.0 - 30.0 ug/mg crt Final   07/20/2022 52.3 (H) 0.0 - 30.0 ug/mg crt Final   06/11/2021 143.8 (H) 0.0 - 30.0 ug/mg crt Final       Objective   No Known Allergies  Social History     Social History Narrative    Not on file      Medication Review  Current Outpatient Medications   Medication Instructions    aspirin 81 mg, oral, Daily    atenolol (TENORMIN) 50 mg, oral, Daily    dapagliflozin propanediol (FARXIGA) 10 mg, oral, Daily    glipiZIDE (Glucotrol) 5 mg tablet TAKE TWO TABLETS BY MOUTH IN THE MORNING AND ONE TABLET WITH DINNER    hydroCHLOROthiazide (HYDRODiuril) 25 mg tablet oral, Daily    latanoprost (Xalatan) 0.005 % ophthalmic solution Administer into affected eye(s). Entered during TW abstraction - no directions provided    lisinopril 20 mg, oral, Daily    lovastatin (MEVACOR) 20 mg, oral, Daily    metFORMIN (GLUCOPHAGE) 1,000 mg, oral, 2 times daily    sildenafil (Viagra) 100 mg tablet TAKE ONE TABLET BY MOUTH ONCE DAILY AS NEEDED FOR ERECTILE DYSFUNCTION    SITagliptin phosphate (JANUVIA) 100 mg, oral, Daily    tadalafil 20 mg, oral, Daily PRN    timolol (Timoptic) 0.5 % ophthalmic solution Administer into affected eye(s). Entered during TW abstraction - no directions provided      Vitals  BP Readings from Last 2 Encounters:   12/16/24 122/78   09/04/24 116/79     BMI Readings from Last 1 Encounters:   12/16/24 25.20 kg/m²      Labs  A1C  Lab Results   Component Value Date    HGBA1C 7.6 (A) 12/16/2024    HGBA1C 7.5 (A) 09/04/2024    HGBA1C 7.4 (A) 05/29/2024     BMP  Lab Results   Component Value Date    CALCIUM 9.7 02/28/2024     02/28/2024    K 4.6 02/28/2024    CO2 29 02/28/2024    CL 99 02/28/2024    BUN 22 02/28/2024    CREATININE 1.09 02/28/2024    EGFR 72 02/28/2024     LFTs  Lab Results   Component Value Date    ALT 18 07/18/2021    AST 18 07/18/2021    ALKPHOS 84 07/18/2021    BILITOT 0.8 07/18/2021      FLP  Lab Results   Component Value Date    TRIG 113 02/28/2024    CHOL 75 02/28/2024    LDLF 10 07/12/2023    LDLCALC 14 02/28/2024    HDL 38.3 02/28/2024     Urine Microalbumin  Lab Results   Component Value Date    MICROALBCREA 82.6 (H) 07/12/2023     Weight Management  Wt Readings from Last 3 Encounters:   12/16/24 86.6 kg (191 lb)   09/04/24 85.8 kg (189 lb 3.2 oz)   05/29/24 83.6 kg (184 lb 3.2 oz)      There is no height or weight on file to calculate BMI.     Assessment/Plan   Problem List Items Addressed This Visit       Type 2 diabetes mellitus with hyperglycemia, without long-term current use of insulin     Patient's goal A1c is < 7.5%.  Is pt at goal? No, patient's most recent A1c from 12/16/2024   Patient's SMBGs are still slightly elevated. Patient is interested in starting another medication to help with his blood sugars.     Rationale for plan: Since patient's FBG is still elevated, patient will start Januvia since he does not want to do an injectable medication.    Januvia (sitagliptin) Education:  Counseled patient on Januvia (sitagliptin) MOA, expectations, side effects, duration of therapy, administration, and monitoring parameters.  Counseled patient on the benefits of Januvia (sitagliptin), such as glycemic control and weight loss potential.  Provided detailed dosing and administration counseling  Reviewed storage requirements of Januvia (sitagliptin) and when to administer the medication if a dose is missed.  Discussed monitoring for pancreatitis (abdominal pain, N/V, jaundice) and heart failure exacerbation  All questions and concerns addressed.     Medication Changes:  CONTINUE  Metformin 1000 mg; take 1 tablet BID  Glipizide 5 mg; take 2 tablets (10 mg) in the mornings and 1 tablet (5 mg) with dinner  Farxiga 10 mg; take 1 tablet daily  START  Januvia 100 mg; take 1 tablet daily    Future Considerations:  If patient starts to have low blood sugars, can decrease glipizide    Monitoring  and Education:  Counseled patient on medications  Counseled patient on goals of therapy  Answered all patient questions and concerns         Relevant Medications    dapagliflozin propanediol (Farxiga) 10 mg tablet    SITagliptin phosphate (Januvia) 100 mg tablet    Other Relevant Orders    Referral to Clinical Pharmacy       Clinical Pharmacist follow-up: 5/1/2025, Telehealth visit    Patient is not followed in [facility]. (If yes, track minutes under compass joni at each visit)    Continue all meds under the continuation of care with the referring provider and clinical pharmacy team.    Thank you,  Daily Watson, PharmD  Clinical Pharmacist - Primary Care  [phone]  4/3/2025    Verbal consent to manage patient's drug therapy was obtained from the patient. They were informed they may decline to participate or withdraw from participation in pharmacy services at any time.

## 2025-04-07 ENCOUNTER — PHARMACY VISIT (OUTPATIENT)
Dept: PHARMACY | Facility: CLINIC | Age: 75
End: 2025-04-07
Payer: MEDICARE

## 2025-04-21 ENCOUNTER — APPOINTMENT (OUTPATIENT)
Dept: PRIMARY CARE | Facility: CLINIC | Age: 75
End: 2025-04-21
Payer: MEDICARE

## 2025-04-21 VITALS
BODY MASS INDEX: 24.23 KG/M2 | WEIGHT: 182.8 LBS | OXYGEN SATURATION: 95 % | HEART RATE: 86 BPM | DIASTOLIC BLOOD PRESSURE: 64 MMHG | SYSTOLIC BLOOD PRESSURE: 100 MMHG | HEIGHT: 73 IN | TEMPERATURE: 97.4 F

## 2025-04-21 DIAGNOSIS — E78.2 HYPERLIPEMIA, MIXED: ICD-10-CM

## 2025-04-21 DIAGNOSIS — Z00.00 ROUTINE GENERAL MEDICAL EXAMINATION AT HEALTH CARE FACILITY: Primary | ICD-10-CM

## 2025-04-21 DIAGNOSIS — I10 ESSENTIAL (PRIMARY) HYPERTENSION: ICD-10-CM

## 2025-04-21 DIAGNOSIS — E11.65 TYPE 2 DIABETES MELLITUS WITH HYPERGLYCEMIA, WITHOUT LONG-TERM CURRENT USE OF INSULIN: ICD-10-CM

## 2025-04-21 DIAGNOSIS — Z12.5 PROSTATE CANCER SCREENING: ICD-10-CM

## 2025-04-21 PROCEDURE — 1170F FXNL STATUS ASSESSED: CPT | Performed by: FAMILY MEDICINE

## 2025-04-21 PROCEDURE — 1159F MED LIST DOCD IN RCRD: CPT | Performed by: FAMILY MEDICINE

## 2025-04-21 PROCEDURE — 99397 PER PM REEVAL EST PAT 65+ YR: CPT | Performed by: FAMILY MEDICINE

## 2025-04-21 PROCEDURE — 1160F RVW MEDS BY RX/DR IN RCRD: CPT | Performed by: FAMILY MEDICINE

## 2025-04-21 PROCEDURE — 3078F DIAST BP <80 MM HG: CPT | Performed by: FAMILY MEDICINE

## 2025-04-21 PROCEDURE — 1036F TOBACCO NON-USER: CPT | Performed by: FAMILY MEDICINE

## 2025-04-21 PROCEDURE — 1123F ACP DISCUSS/DSCN MKR DOCD: CPT | Performed by: FAMILY MEDICINE

## 2025-04-21 PROCEDURE — 3074F SYST BP LT 130 MM HG: CPT | Performed by: FAMILY MEDICINE

## 2025-04-21 PROCEDURE — G0439 PPPS, SUBSEQ VISIT: HCPCS | Performed by: FAMILY MEDICINE

## 2025-04-21 PROCEDURE — 4010F ACE/ARB THERAPY RXD/TAKEN: CPT | Performed by: FAMILY MEDICINE

## 2025-04-21 ASSESSMENT — ACTIVITIES OF DAILY LIVING (ADL)
BATHING: INDEPENDENT
GROCERY_SHOPPING: INDEPENDENT
MANAGING_FINANCES: INDEPENDENT
DOING_HOUSEWORK: INDEPENDENT
DRESSING: INDEPENDENT

## 2025-04-21 ASSESSMENT — ENCOUNTER SYMPTOMS
OCCASIONAL FEELINGS OF UNSTEADINESS: 0
LOSS OF SENSATION IN FEET: 0
DEPRESSION: 0

## 2025-04-21 NOTE — PROGRESS NOTES
"Subjective   Reason for Visit: Juan Alberto Salcido is an 75 y.o. male here for a Medicare Wellness visit.     Past Medical, Surgical, and Family History reviewed and updated in chart.    Reviewed all medications by prescribing practitioner or clinical pharmacist (such as prescriptions, OTCs, herbal therapies and supplements) and documented in the medical record.    Glucose 140's in AM  Not checking BP  No dizziness, CP, SOB, or edema  No numbness or tingling in feet.  Will be seeing ophthalmologist tomorrow.    Sleep and appetite are good.  Energy is good.   Still working.            Patient Care Team:  Deborah Hartmann MD as PCP - General (Family Medicine)  Deborah Hartmann MD as PCP - [employer] PCP     Review of Systems    Objective   Vitals:  /64 (BP Location: Right arm, Patient Position: Sitting, BP Cuff Size: Large adult)   Pulse 86   Temp 36.3 °C (97.4 °F) (Temporal)   Ht 1.854 m (6' 1\")   Wt 82.9 kg (182 lb 12.8 oz)   SpO2 95%   BMI 24.12 kg/m²       Physical Exam  Vitals reviewed.   Constitutional:       Appearance: Normal appearance.   Neck:      Thyroid: No thyromegaly.   Cardiovascular:      Rate and Rhythm: Normal rate and regular rhythm.      Heart sounds: No murmur heard.  Pulmonary:      Effort: Pulmonary effort is normal.      Breath sounds: Normal breath sounds.   Musculoskeletal:      Cervical back: Normal range of motion and neck supple.      Right lower leg: No edema.      Left lower leg: No edema.   Lymphadenopathy:      Cervical: No cervical adenopathy.   Neurological:      General: No focal deficit present.      Mental Status: He is alert and oriented to person, place, and time.         Assessment & Plan  Routine general medical examination at health care facility    Orders:    1 Year Follow Up In Advanced Primary Care - PCP - Wellness Exam; Future    Essential (primary) hypertension         Hyperlipemia, mixed         Type 2 diabetes mellitus with hyperglycemia, without " long-term current use of insulin    Orders:    Albumin-Creatinine Ratio, Urine Random    Hemoglobin A1C    Basic Metabolic Panel    Lipid Panel    Body mass index (BMI) 24.0-24.9, adult         Prostate cancer screening    Orders:    Prostate Specific Antigen, Screen

## 2025-04-21 NOTE — PATIENT INSTRUCTIONS
Immunizations recommended:  --Flu shot every fall.   --Prevnar 20 once -done  --Shingrix, to reduce the risk of shingles.  It can be done at the pharmacy.  --Tetanus every 10 years. Yours is due, can do at the pharmacy.  --Covid vaccine.  --RSV vaccine once, can be done at pharmacy    Colonoscopy should be done every 10 years after the age of 45, unless there was a previous abnormality, or family history of colon cancer. (Will check to see when done)    You should try to get 150 minutes of exercise weekly.  Be sure to eat a diet rich in fruits and vegetables, and low in saturated fats and sodium.  Strive for a healthy BMI of less than 25.     Make sure to wear sun screen when outside, and check for changing or unusual moles.    I recommend you get a Living Will and Durable Power of  for Healthcare. These documents state what care you would want if you couldn't speak for yourself. They help your loved ones in caring for you if that happens.   Once you have those forms completed, you can keep a copy on file with your doctor.    Specialists being seen:  None currently.      A1C 7.6  Continue Glipizide 5 mg twice a day, Farxiga 10 mg, and metformin 1000 mg twice a day.  Working with  pharmacist.  Plan to follow up in 3-4 months, and will recheck A1C.                                               Treatment goals include:  HgbA1C less than 7.5  /80 on consistent basis, with no higher than 140/85  LDL cholesterol less than 100, and HDL cholesterol above 40.  Yearly retinal exam  Check feet periodically for sores or decreased sensation  Exercise at least 150 minutes weekly.  Work toward a goal BMI of less than 25.     BP is controlled.  Continue lisinopril 20 mg and hydrochlorothiazide 25 mg daily.               Check fasting blood work this AM.

## 2025-04-21 NOTE — ASSESSMENT & PLAN NOTE
Orders:    Albumin-Creatinine Ratio, Urine Random    Hemoglobin A1C    Basic Metabolic Panel    Lipid Panel

## 2025-04-22 LAB
ALBUMIN/CREAT UR: 20 MG/G CREAT
ANION GAP SERPL CALCULATED.4IONS-SCNC: 14 MMOL/L (CALC) (ref 7–17)
BUN SERPL-MCNC: 17 MG/DL (ref 7–25)
BUN/CREAT SERPL: ABNORMAL (CALC) (ref 6–22)
CALCIUM SERPL-MCNC: 9.6 MG/DL (ref 8.6–10.3)
CHLORIDE SERPL-SCNC: 102 MMOL/L (ref 98–110)
CHOLEST SERPL-MCNC: 73 MG/DL
CHOLEST/HDLC SERPL: 1.8 (CALC)
CO2 SERPL-SCNC: 22 MMOL/L (ref 20–32)
CREAT SERPL-MCNC: 1.18 MG/DL (ref 0.7–1.28)
CREAT UR-MCNC: 107 MG/DL (ref 20–320)
EGFRCR SERPLBLD CKD-EPI 2021: 64 ML/MIN/1.73M2
EST. AVERAGE GLUCOSE BLD GHB EST-MCNC: 166 MG/DL
EST. AVERAGE GLUCOSE BLD GHB EST-SCNC: 9.2 MMOL/L
GLUCOSE SERPL-MCNC: 141 MG/DL (ref 65–99)
HBA1C MFR BLD: 7.4 %
HDLC SERPL-MCNC: 40 MG/DL
LDLC SERPL CALC-MCNC: 17 MG/DL (CALC)
MICROALBUMIN UR-MCNC: 2.1 MG/DL
NONHDLC SERPL-MCNC: 33 MG/DL (CALC)
POTASSIUM SERPL-SCNC: 4.2 MMOL/L (ref 3.5–5.3)
PSA SERPL-MCNC: 1.35 NG/ML
SODIUM SERPL-SCNC: 138 MMOL/L (ref 135–146)
TRIGL SERPL-MCNC: 78 MG/DL

## 2025-04-24 NOTE — RESULT ENCOUNTER NOTE
A1C 7.4.  I will make sure Daily gets that info.  Kidney function is normal.  Cholesterol is good.  PSA is normal

## 2025-04-24 NOTE — RESULT ENCOUNTER NOTE
Dr. Hartmann,     Thank you for letting me know. It did come down a little bit, I will keep working with him to further lower his A1c!

## 2025-05-01 ENCOUNTER — APPOINTMENT (OUTPATIENT)
Dept: PHARMACY | Facility: HOSPITAL | Age: 75
End: 2025-05-01
Payer: MEDICARE

## 2025-05-01 DIAGNOSIS — E11.65 TYPE 2 DIABETES MELLITUS WITH HYPERGLYCEMIA, WITHOUT LONG-TERM CURRENT USE OF INSULIN: ICD-10-CM

## 2025-05-01 NOTE — PROGRESS NOTES
Clinical Pharmacy Appointment    Patient ID: Juan Alberto Salcido is a 75 y.o. male who presents for Diabetes.    Pt is here for Follow Up appointment.     Referring Provider: Deborah Hartmann MD  PCP: Deborah Hartmann MD  Last visit with PCP: 4/21/2025   Next visit with PCP: 8/25/2025    Subjective   Drug Interactions  No relevant drug interactions were noted.    Medication System Management  Patient's preferred pharmacy:  [id number] in [place]  Adherence/Organization: No concerns at this time  Affordability/Accessibility:  PAP approved through 2/21/2026    HPI  DIABETES MELLITUS TYPE 2:    Does patient follow with Endocrinology: No  Last optometry exam: 12/2024  Most recent visit in Podiatry: checks feet regularly at home -- patient denies sores or cuts on feet today      Current diabetic medications include:  Metformin 1000 mg; take 1 tablet BID  Glipizde 5 mg; take 2 tablets (10 mg) in the mornings and 1 tablet (5 mg) with dinner)  Farxiga 10 mg; take 1 tablet daily  Januvia 100 mg; take 1 tablet daily    Clarifications to above regimen: None  Adverse Effects: None    Glucose Readings:  Glucometer/CGM Type: Glucometer  Patient tests BG 1-2 times per week    Current home BG readings (mg/dL): 118-119 mg/dL  Previous home BG readings (mg/dL): 140's mg/dL    Any episodes of hypoglycemia? No, patient has not been experiencing any low blood sugars.  Did patient treat episode of hypoglycemia appropriately? N/A  Does the patient have a prescription for ready-to-use Glucagon? Not on insulin    Lifestyle:  Diet: 2 meals/day.   BK: Cereal  DN: Fish, chicken, steak  Snacks: cookies  Drinks: water, soda, sometimes coffee  Exercise:   Patient works a physical job  Tobacco history: Non-smoker    Secondary Prevention:  Statin? Yes  ACE-I/ARB? Yes  Aspirin? Yes    Pertinent PMH Review:  PMH of Pancreatitis: No  PMH of Retinopathy: No  PMH of Urinary Tract Infections: No  PMH of MTC: No  PMH of MEN2:  No  UACR/EGFR in last year?: Yes  ALBUMIN/CREATININE RATIO, RANDOM URINE   Date Value Ref Range Status   04/21/2025 20 <30 mg/g creat Final     Comment:        The ADA defines abnormalities in albumin  excretion as follows:     Albuminuria Category        Result (mg/g creatinine)     Normal to Mildly increased   <30  Moderately increased            Severely increased           > OR = 300     The ADA recommends that at least two of three  specimens collected within a 3-6 month period be  abnormal before considering a patient to be  within a diagnostic category.       Albumin/Creatine Ratio   Date Value Ref Range Status   07/12/2023 82.6 (H) 0.0 - 30.0 ug/mg crt Final   07/20/2022 52.3 (H) 0.0 - 30.0 ug/mg crt Final       Objective   Allergies[1]  Social History     Social History Narrative    Not on file      Medication Review  Current Outpatient Medications   Medication Instructions    aspirin 81 mg, Daily    atenolol (TENORMIN) 50 mg, oral, Daily    Farxiga 10 mg, oral, Daily    glipiZIDE (Glucotrol) 5 mg tablet TAKE TWO TABLETS BY MOUTH IN THE MORNING AND ONE TABLET WITH DINNER    hydroCHLOROthiazide (HYDRODiuril) 25 mg tablet oral, Daily    Januvia 100 mg, oral, Daily    latanoprost (Xalatan) 0.005 % ophthalmic solution Administer into affected eye(s). Entered during TW abstraction - no directions provided    lisinopril 20 mg, oral, Daily    lovastatin (MEVACOR) 20 mg, oral, Daily    metFORMIN (GLUCOPHAGE) 1,000 mg, oral, 2 times daily    sildenafil (Viagra) 100 mg tablet TAKE ONE TABLET BY MOUTH ONCE DAILY AS NEEDED FOR ERECTILE DYSFUNCTION    timolol (Timoptic) 0.5 % ophthalmic solution Administer into affected eye(s). Entered during TW abstraction - no directions provided      Vitals  BP Readings from Last 2 Encounters:   04/21/25 100/64   12/16/24 122/78     BMI Readings from Last 1 Encounters:   04/21/25 24.12 kg/m²      Labs  A1C  Lab Results   Component Value Date    HGBA1C 7.4 (H) 04/21/2025     HGBA1C 7.6 (A) 12/16/2024    HGBA1C 7.5 (A) 09/04/2024     BMP  Lab Results   Component Value Date    CALCIUM 9.6 04/21/2025     04/21/2025    K 4.2 04/21/2025    CO2 22 04/21/2025     04/21/2025    BUN 17 04/21/2025    CREATININE 1.18 04/21/2025    EGFR 64 04/21/2025     LFTs  Lab Results   Component Value Date    ALT 18 07/18/2021    AST 18 07/18/2021    ALKPHOS 84 07/18/2021    BILITOT 0.8 07/18/2021     FLP  Lab Results   Component Value Date    TRIG 78 04/21/2025    CHOL 73 04/21/2025    LDLF 10 07/12/2023    LDLCALC 17 04/21/2025    HDL 40 04/21/2025     Urine Microalbumin  Lab Results   Component Value Date    MICROALBCREA 20 04/21/2025     Weight Management  Wt Readings from Last 3 Encounters:   04/21/25 82.9 kg (182 lb 12.8 oz)   12/16/24 86.6 kg (191 lb)   09/04/24 85.8 kg (189 lb 3.2 oz)      There is no height or weight on file to calculate BMI.     Assessment/Plan   Problem List Items Addressed This Visit       Type 2 diabetes mellitus with hyperglycemia, without long-term current use of insulin    Patient's goal A1c is < 7.5%.  Is pt at goal? Yes, patient's most recent A1c from 4/21/2025 was 7.4%   Patient's SMBGs are at goal, blood sugars around 118-119 mg/dL.     Rationale for plan: Patient is well controlled at this time, will continue current medications.    Medication Changes:  CONTINUE  Metformin 1000 mg; take 1 tablet BID  Glipizde 5 mg; take 2 tablets (10 mg) in the mornings and 1 tablet (5 mg) with dinner)  Farxiga 10 mg; take 1 tablet daily  Januvia 100 mg; take 1 tablet daily    Future Considerations:  If patient starts having low blood sugars, counseled patient to call pharmacist    Monitoring and Education:  Counseled patient on medications including adverse effects, MOA, dosing  Answered all patient questions and concerns         Relevant Orders    Referral to Clinical Pharmacy       Clinical Pharmacist follow-up: 6/5/2025, Telehealth visit    Patient is not followed in [facility].  (If yes, track minutes under compass joni at each visit)    Continue all meds under the continuation of care with the referring provider and clinical pharmacy team.    Thank you,  Daily Watson, PharmD  Clinical Pharmacist - Primary Care  [phone]  5/1/2025    Verbal consent to manage patient's drug therapy was obtained from the patient. They were informed they may decline to participate or withdraw from participation in pharmacy services at any time.         [1] No Known Allergies

## 2025-05-01 NOTE — ASSESSMENT & PLAN NOTE
Patient's goal A1c is < 7.5%.  Is pt at goal? Yes, patient's most recent A1c from 4/21/2025 was 7.4%   Patient's SMBGs are at goal, blood sugars around 118-119 mg/dL.     Rationale for plan: Patient is well controlled at this time, will continue current medications.    Medication Changes:  CONTINUE  Metformin 1000 mg; take 1 tablet BID  Glipizde 5 mg; take 2 tablets (10 mg) in the mornings and 1 tablet (5 mg) with dinner)  Farxiga 10 mg; take 1 tablet daily  Januvia 100 mg; take 1 tablet daily    Future Considerations:  If patient starts having low blood sugars, counseled patient to call pharmacist    Monitoring and Education:  Counseled patient on medications including adverse effects, MOA, dosing  Answered all patient questions and concerns

## 2025-05-12 DIAGNOSIS — E11.65 TYPE 2 DIABETES MELLITUS WITH HYPERGLYCEMIA, WITHOUT LONG-TERM CURRENT USE OF INSULIN: ICD-10-CM

## 2025-05-12 DIAGNOSIS — I10 ESSENTIAL (PRIMARY) HYPERTENSION: ICD-10-CM

## 2025-05-12 RX ORDER — METFORMIN HYDROCHLORIDE 1000 MG/1
1000 TABLET ORAL 2 TIMES DAILY
Qty: 180 TABLET | Refills: 1 | Status: SHIPPED | OUTPATIENT
Start: 2025-05-12

## 2025-05-12 RX ORDER — LISINOPRIL 20 MG/1
20 TABLET ORAL DAILY
Qty: 90 TABLET | Refills: 1 | Status: SHIPPED | OUTPATIENT
Start: 2025-05-12

## 2025-05-19 DIAGNOSIS — E78.2 HYPERLIPEMIA, MIXED: ICD-10-CM

## 2025-05-20 RX ORDER — LOVASTATIN 20 MG/1
20 TABLET ORAL DAILY
Qty: 90 TABLET | Refills: 2 | Status: SHIPPED | OUTPATIENT
Start: 2025-05-20

## 2025-05-27 DIAGNOSIS — E11.65 TYPE 2 DIABETES MELLITUS WITH HYPERGLYCEMIA, WITHOUT LONG-TERM CURRENT USE OF INSULIN: ICD-10-CM

## 2025-05-27 RX ORDER — GLIPIZIDE 5 MG/1
TABLET ORAL
Qty: 270 TABLET | Refills: 0 | Status: SHIPPED | OUTPATIENT
Start: 2025-05-27

## 2025-05-30 DIAGNOSIS — I10 ESSENTIAL (PRIMARY) HYPERTENSION: ICD-10-CM

## 2025-05-30 RX ORDER — ATENOLOL 50 MG/1
50 TABLET ORAL DAILY
Qty: 90 TABLET | Refills: 0 | Status: SHIPPED | OUTPATIENT
Start: 2025-05-30

## 2025-06-05 ENCOUNTER — APPOINTMENT (OUTPATIENT)
Dept: PHARMACY | Facility: HOSPITAL | Age: 75
End: 2025-06-05
Payer: MEDICARE

## 2025-06-05 DIAGNOSIS — E11.65 TYPE 2 DIABETES MELLITUS WITH HYPERGLYCEMIA, WITHOUT LONG-TERM CURRENT USE OF INSULIN: ICD-10-CM

## 2025-06-05 NOTE — PROGRESS NOTES
Clinical Pharmacy Appointment    Patient ID: Juan Alberto Salcido is a 75 y.o. male who presents for Diabetes.    Pt is here for Follow Up appointment.     Referring Provider: Deborah Hartmann MD  PCP: Deborah Hartmann MD  Last visit with PCP: 4/21/2025   Next visit with PCP: 8/25/2025    Subjective   Drug Interactions  No relevant drug interactions were noted.    Medication System Management  Patient's preferred pharmacy:  [id number] in [place]  Adherence/Organization: No concerns at this time  Affordability/Accessibility:  PAP approved through 2/21/2026    HPI  DIABETES MELLITUS TYPE 2:    Does patient follow with Endocrinology: No  Last optometry exam: 12/2024  Most recent visit in Podiatry: checks feet regularly at home -- patient denies sores or cuts on feet today      Current diabetic medications include:  Metformin 1000 mg; take 1 tablet BID  Glipizde 5 mg; take 2 tablets (10 mg) in the mornings and 1 tablet (5 mg) with dinner)  Farxiga 10 mg; take 1 tablet daily  Januvia 100 mg; take 1 tablet daily    Clarifications to above regimen: None  Adverse Effects: None    Glucose Readings:  Glucometer/CGM Type: Glucometer  Patient tests BG 1-2 times per week    Current home BG readings (mg/dL): 117-130 mg/dL  Previous home BG readings (mg/dL): 118-119 mg/dL    Any episodes of hypoglycemia? No, patient has not been experiencing any low blood sugars.  Did patient treat episode of hypoglycemia appropriately? N/A  Does the patient have a prescription for ready-to-use Glucagon? Not on insulin    Lifestyle:  Diet: 2 meals/day.   BK: Cereal  DN: Fish, chicken, steak  Snacks: cookies  Drinks: water, soda, sometimes coffee  Exercise:   Patient works a physical job  Tobacco history: Non-smoker    Secondary Prevention:  Statin? Yes  ACE-I/ARB? Yes  Aspirin? Yes    Pertinent PMH Review:  PMH of Pancreatitis: No  PMH of Retinopathy: No  PMH of Urinary Tract Infections: No  PMH of MTC: No  PMH of MEN2:  No  UACR/EGFR in last year?: Yes  ALBUMIN/CREATININE RATIO, RANDOM URINE   Date Value Ref Range Status   04/21/2025 20 <30 mg/g creat Final     Comment:        The ADA defines abnormalities in albumin  excretion as follows:     Albuminuria Category        Result (mg/g creatinine)     Normal to Mildly increased   <30  Moderately increased            Severely increased           > OR = 300     The ADA recommends that at least two of three  specimens collected within a 3-6 month period be  abnormal before considering a patient to be  within a diagnostic category.       Albumin/Creatine Ratio   Date Value Ref Range Status   07/12/2023 82.6 (H) 0.0 - 30.0 ug/mg crt Final   07/20/2022 52.3 (H) 0.0 - 30.0 ug/mg crt Final       Objective   Allergies[1]  Social History     Social History Narrative    Not on file      Medication Review  Current Outpatient Medications   Medication Instructions    aspirin 81 mg, Daily    atenolol (TENORMIN) 50 mg, oral, Daily    Farxiga 10 mg, oral, Daily    glipiZIDE (Glucotrol) 5 mg tablet TAKE TWO TABLETS BY MOUTH IN THE MORNING AND ONE TABLET WITH DINNER    hydroCHLOROthiazide (HYDRODiuril) 25 mg tablet oral, Daily    Januvia 100 mg, oral, Daily    latanoprost (Xalatan) 0.005 % ophthalmic solution Administer into affected eye(s). Entered during TW abstraction - no directions provided    lisinopril 20 mg, oral, Daily    lovastatin (MEVACOR) 20 mg, oral, Daily    metFORMIN (GLUCOPHAGE) 1,000 mg, oral, 2 times daily    sildenafil (Viagra) 100 mg tablet TAKE ONE TABLET BY MOUTH ONCE DAILY AS NEEDED FOR ERECTILE DYSFUNCTION    timolol (Timoptic) 0.5 % ophthalmic solution Administer into affected eye(s). Entered during TW abstraction - no directions provided      Vitals  BP Readings from Last 2 Encounters:   04/21/25 100/64   12/16/24 122/78     BMI Readings from Last 1 Encounters:   04/21/25 24.12 kg/m²      Labs  A1C  Lab Results   Component Value Date    HGBA1C 7.4 (H) 04/21/2025     HGBA1C 7.6 (A) 12/16/2024    HGBA1C 7.5 (A) 09/04/2024     BMP  Lab Results   Component Value Date    CALCIUM 9.6 04/21/2025     04/21/2025    K 4.2 04/21/2025    CO2 22 04/21/2025     04/21/2025    BUN 17 04/21/2025    CREATININE 1.18 04/21/2025    EGFR 64 04/21/2025     LFTs  Lab Results   Component Value Date    ALT 18 07/18/2021    AST 18 07/18/2021    ALKPHOS 84 07/18/2021    BILITOT 0.8 07/18/2021     FLP  Lab Results   Component Value Date    TRIG 78 04/21/2025    CHOL 73 04/21/2025    LDLF 10 07/12/2023    LDLCALC 17 04/21/2025    HDL 40 04/21/2025     Urine Microalbumin  Lab Results   Component Value Date    MICROALBCREA 20 04/21/2025     Weight Management  Wt Readings from Last 3 Encounters:   04/21/25 82.9 kg (182 lb 12.8 oz)   12/16/24 86.6 kg (191 lb)   09/04/24 85.8 kg (189 lb 3.2 oz)      There is no height or weight on file to calculate BMI.     Assessment/Plan   Problem List Items Addressed This Visit       Type 2 diabetes mellitus with hyperglycemia, without long-term current use of insulin    Patient's goal A1c is < 7.5%.  Is pt at goal? Yes, patient's most recent A1c from 4/21/2025 was 7.4%   Patient's SMBGs are at goal, blood sugars around 118-119 mg/dL.     Rationale for plan: Patient is well controlled at this time, will continue current medications.     Medication Changes:  CONTINUE  Metformin 1000 mg; take 1 tablet BID  Glipizde 5 mg; take 2 tablets (10 mg) in the mornings and 1 tablet (5 mg) with dinner)  Farxiga 10 mg; take 1 tablet daily  Januvia 100 mg; take 1 tablet daily     Future Considerations:  If patient starts having low blood sugars, counseled patient to call pharmacist     Monitoring and Education:  Counseled patient on medications including adverse effects, MOA, dosing  Answered all patient questions and concerns         Relevant Orders    Referral to Clinical Pharmacy         Clinical Pharmacist follow-up: 6/5/2025, Telehealth visit    Patient is not followed in  [facility]. (If yes, track minutes under compass joni at each visit)    Continue all meds under the continuation of care with the referring provider and clinical pharmacy team.    Thank you,  Daily Watson, PharmD  Clinical Pharmacist - Primary Care  [phone]  5/1/2025    Verbal consent to manage patient's drug therapy was obtained from the patient. They were informed they may decline to participate or withdraw from participation in pharmacy services at any time.         [1] No Known Allergies

## 2025-06-08 DIAGNOSIS — N52.9 MALE ERECTILE DISORDER OF ORGANIC ORIGIN: ICD-10-CM

## 2025-06-09 RX ORDER — SILDENAFIL 100 MG/1
TABLET, FILM COATED ORAL
Qty: 12 TABLET | Refills: 0 | Status: SHIPPED | OUTPATIENT
Start: 2025-06-09 | End: 2025-06-10 | Stop reason: SDUPTHER

## 2025-06-10 DIAGNOSIS — N52.9 MALE ERECTILE DISORDER OF ORGANIC ORIGIN: ICD-10-CM

## 2025-06-11 RX ORDER — SILDENAFIL 100 MG/1
100 TABLET, FILM COATED ORAL AS NEEDED
Qty: 12 TABLET | Refills: 0 | Status: SHIPPED | OUTPATIENT
Start: 2025-06-11

## 2025-06-16 DIAGNOSIS — I10 ESSENTIAL (PRIMARY) HYPERTENSION: ICD-10-CM

## 2025-06-16 RX ORDER — HYDROCHLOROTHIAZIDE 25 MG/1
12.5 TABLET ORAL DAILY
Qty: 45 TABLET | Refills: 0 | Status: SHIPPED | OUTPATIENT
Start: 2025-06-16

## 2025-07-10 ENCOUNTER — APPOINTMENT (OUTPATIENT)
Dept: PHARMACY | Facility: HOSPITAL | Age: 75
End: 2025-07-10
Payer: MEDICARE

## 2025-07-10 DIAGNOSIS — E11.65 TYPE 2 DIABETES MELLITUS WITH HYPERGLYCEMIA, WITHOUT LONG-TERM CURRENT USE OF INSULIN: ICD-10-CM

## 2025-07-10 PROCEDURE — RXMED WILLOW AMBULATORY MEDICATION CHARGE

## 2025-07-10 NOTE — PROGRESS NOTES
Clinical Pharmacy Appointment    Patient ID: Juan Alberto Salcido is a 75 y.o. male who presents for Diabetes.    Pt is here for Follow Up appointment.     Referring Provider: Deborah Hartmann MD  PCP: Deborah Hartmann MD  Last visit with PCP: 4/21/2025   Next visit with PCP: 8/25/2025    Subjective   Drug Interactions  No relevant drug interactions were noted.    Medication System Management  Patient's preferred pharmacy:  [id number] in [place]  Adherence/Organization: No concerns at this time  Affordability/Accessibility:  PAP approved through 2/21/2026    HPI  DIABETES MELLITUS TYPE 2:    Does patient follow with Endocrinology: No  Last optometry exam: 12/2024  Most recent visit in Podiatry: checks feet regularly at home -- patient denies sores or cuts on feet today      Current diabetic medications include:  Metformin 1000 mg; take 1 tablet BID  Glipizde 5 mg; take 2 tablets (10 mg) in the mornings and 1 tablet (5 mg) with dinner)  Farxiga 10 mg; take 1 tablet daily  Januvia 100 mg; take 1 tablet daily    Clarifications to above regimen: None  Adverse Effects: None    Glucose Readings:  Glucometer/CGM Type: Glucometer  Patient tests BG 1-2 times per week    Current home BG readings (mg/dL): 116-140 mg/dL  Previous home BG readings (mg/dL): 117-130 mg/dL    Any episodes of hypoglycemia? No, patient has not been experiencing any low blood sugars.  Did patient treat episode of hypoglycemia appropriately? N/A  Does the patient have a prescription for ready-to-use Glucagon? Not on insulin    Lifestyle:  Diet: 2 meals/day.   BK: Cereal  DN: Fish, chicken, steak  Snacks: cookies  Drinks: water, soda, sometimes coffee  Exercise:   Patient works a physical job  Tobacco history: Non-smoker    Secondary Prevention:  Statin? Yes  ACE-I/ARB? Yes  Aspirin? Yes    Pertinent PMH Review:  PMH of Pancreatitis: No  PMH of Retinopathy: No  PMH of Urinary Tract Infections: No  PMH of MTC: No  PMH of MEN2:  No  UACR/EGFR in last year?: Yes  ALBUMIN/CREATININE RATIO, RANDOM URINE   Date Value Ref Range Status   04/21/2025 20 <30 mg/g creat Final     Comment:        The ADA defines abnormalities in albumin  excretion as follows:     Albuminuria Category        Result (mg/g creatinine)     Normal to Mildly increased   <30  Moderately increased            Severely increased           > OR = 300     The ADA recommends that at least two of three  specimens collected within a 3-6 month period be  abnormal before considering a patient to be  within a diagnostic category.       Albumin/Creatine Ratio   Date Value Ref Range Status   07/12/2023 82.6 (H) 0.0 - 30.0 ug/mg crt Final   07/20/2022 52.3 (H) 0.0 - 30.0 ug/mg crt Final       Objective   Allergies[1]  Social History     Social History Narrative    Not on file      Medication Review  Current Outpatient Medications   Medication Instructions    aspirin 81 mg, Daily    atenolol (TENORMIN) 50 mg, oral, Daily    Farxiga 10 mg, oral, Daily    glipiZIDE (Glucotrol) 5 mg tablet TAKE TWO TABLETS BY MOUTH IN THE MORNING AND ONE TABLET WITH DINNER    hydroCHLOROthiazide (HYDRODIURIL) 12.5 mg, oral, Daily    Januvia 100 mg, oral, Daily    latanoprost (Xalatan) 0.005 % ophthalmic solution Administer into affected eye(s). Entered during TW abstraction - no directions provided    lisinopril 20 mg, oral, Daily    lovastatin (MEVACOR) 20 mg, oral, Daily    metFORMIN (GLUCOPHAGE) 1,000 mg, oral, 2 times daily    sildenafil (VIAGRA) 100 mg, oral, As needed    timolol (Timoptic) 0.5 % ophthalmic solution Administer into affected eye(s). Entered during TW abstraction - no directions provided      Vitals  BP Readings from Last 2 Encounters:   04/21/25 100/64   12/16/24 122/78     BMI Readings from Last 1 Encounters:   04/21/25 24.12 kg/m²      Labs  A1C  Lab Results   Component Value Date    HGBA1C 7.4 (H) 04/21/2025    HGBA1C 7.6 (A) 12/16/2024    HGBA1C 7.5 (A) 09/04/2024     BMP  Lab  Results   Component Value Date    CALCIUM 9.6 04/21/2025     04/21/2025    K 4.2 04/21/2025    CO2 22 04/21/2025     04/21/2025    BUN 17 04/21/2025    CREATININE 1.18 04/21/2025    EGFR 64 04/21/2025     LFTs  Lab Results   Component Value Date    ALT 18 07/18/2021    AST 18 07/18/2021    ALKPHOS 84 07/18/2021    BILITOT 0.8 07/18/2021     FLP  Lab Results   Component Value Date    TRIG 78 04/21/2025    CHOL 73 04/21/2025    LDLF 10 07/12/2023    LDLCALC 17 04/21/2025    HDL 40 04/21/2025     Urine Microalbumin  Lab Results   Component Value Date    MICROALBCREA 20 04/21/2025     Weight Management  Wt Readings from Last 3 Encounters:   04/21/25 82.9 kg (182 lb 12.8 oz)   12/16/24 86.6 kg (191 lb)   09/04/24 85.8 kg (189 lb 3.2 oz)      There is no height or weight on file to calculate BMI.     Assessment/Plan   Problem List Items Addressed This Visit       Type 2 diabetes mellitus with hyperglycemia, without long-term current use of insulin    Patient's goal A1c is < 7.5%.  Is pt at goal? Yes, patient's most recent A1c from 4/21/2025 was 7.4%  Patient's SMBGs are mostly at goal.     Rationale for plan: patient is well controlled with his current medications at this time.    Medication Changes:  CONTINUE  Metformin 1000 mg; take 1 tablet BID  Glipizde 5 mg; take 2 tablets (10 mg) in the mornings and 1 tablet (5 mg) with dinner)  Farxiga 10 mg; take 1 tablet daily  Januvia 100 mg; take 1 tablet daily    Monitoring and Education:  Counseled patient on medications  Answered all patient question and concerns         Relevant Orders    Referral to Clinical Pharmacy       Clinical Pharmacist follow-up: 8/7/2025, Telehealth visit    Patient is not followed in [facility]. (If yes, track minutes under compass joni at each visit)    Continue all meds under the continuation of care with the referring provider and clinical pharmacy team.    Thank you,  Daily Watson, PharmD  Clinical Pharmacist - Primary  [facility]  [phone]  7/10/2025    Verbal consent to manage patient's drug therapy was obtained from the patient. They were informed they may decline to participate or withdraw from participation in pharmacy services at any time.         [1] No Known Allergies

## 2025-07-10 NOTE — ASSESSMENT & PLAN NOTE
Patient's goal A1c is < 7.5%.  Is pt at goal? Yes, patient's most recent A1c from 4/21/2025 was 7.4%  Patient's SMBGs are mostly at goal.     Rationale for plan: patient is well controlled with his current medications at this time.    Medication Changes:  CONTINUE  Metformin 1000 mg; take 1 tablet BID  Glipizde 5 mg; take 2 tablets (10 mg) in the mornings and 1 tablet (5 mg) with dinner)  Farxiga 10 mg; take 1 tablet daily  Januvia 100 mg; take 1 tablet daily    Monitoring and Education:  Counseled patient on medications  Answered all patient question and concerns

## 2025-07-11 ENCOUNTER — PHARMACY VISIT (OUTPATIENT)
Dept: PHARMACY | Facility: CLINIC | Age: 75
End: 2025-07-11
Payer: MEDICARE

## 2025-07-13 ENCOUNTER — HOSPITAL ENCOUNTER (INPATIENT)
Facility: HOSPITAL | Age: 75
End: 2025-07-13
Attending: STUDENT IN AN ORGANIZED HEALTH CARE EDUCATION/TRAINING PROGRAM | Admitting: HOSPITALIST
Payer: MEDICARE

## 2025-07-13 ENCOUNTER — APPOINTMENT (OUTPATIENT)
Dept: CARDIOLOGY | Facility: HOSPITAL | Age: 75
End: 2025-07-13
Payer: MEDICARE

## 2025-07-13 ENCOUNTER — APPOINTMENT (OUTPATIENT)
Dept: RADIOLOGY | Facility: HOSPITAL | Age: 75
End: 2025-07-13
Payer: MEDICARE

## 2025-07-13 DIAGNOSIS — E78.2 HYPERLIPEMIA, MIXED: ICD-10-CM

## 2025-07-13 DIAGNOSIS — N17.9 AKI (ACUTE KIDNEY INJURY): ICD-10-CM

## 2025-07-13 DIAGNOSIS — K92.1 MELENA: ICD-10-CM

## 2025-07-13 DIAGNOSIS — K92.0 HEMATEMESIS WITH NAUSEA: Primary | ICD-10-CM

## 2025-07-13 DIAGNOSIS — E11.65 TYPE 2 DIABETES MELLITUS WITH HYPERGLYCEMIA, WITHOUT LONG-TERM CURRENT USE OF INSULIN: ICD-10-CM

## 2025-07-13 DIAGNOSIS — K29.01 ACUTE GASTRITIS WITH HEMORRHAGE, UNSPECIFIED GASTRITIS TYPE: ICD-10-CM

## 2025-07-13 DIAGNOSIS — D50.0 BLOOD LOSS ANEMIA: ICD-10-CM

## 2025-07-13 LAB
ABO GROUP (TYPE) IN BLOOD: NORMAL
ABO GROUP (TYPE) IN BLOOD: NORMAL
ALBUMIN SERPL BCP-MCNC: 4 G/DL (ref 3.4–5)
ALP SERPL-CCNC: 46 U/L (ref 33–136)
ALT SERPL W P-5'-P-CCNC: 12 U/L (ref 10–52)
ANION GAP SERPL CALC-SCNC: 22 MMOL/L (ref 10–20)
ANTIBODY SCREEN: NORMAL
AST SERPL W P-5'-P-CCNC: 10 U/L (ref 9–39)
BASOPHILS # BLD AUTO: 0.03 X10*3/UL (ref 0–0.1)
BASOPHILS NFR BLD AUTO: 0.2 %
BILIRUB SERPL-MCNC: 0.4 MG/DL (ref 0–1.2)
BLOOD EXPIRATION DATE: NORMAL
BUN SERPL-MCNC: 77 MG/DL (ref 6–23)
CALCIUM SERPL-MCNC: 9.5 MG/DL (ref 8.6–10.3)
CHLORIDE SERPL-SCNC: 101 MMOL/L (ref 98–107)
CO2 SERPL-SCNC: 17 MMOL/L (ref 21–32)
CREAT SERPL-MCNC: 1.96 MG/DL (ref 0.5–1.3)
DISPENSE STATUS: NORMAL
EGFRCR SERPLBLD CKD-EPI 2021: 35 ML/MIN/1.73M*2
EOSINOPHIL # BLD AUTO: 0.03 X10*3/UL (ref 0–0.4)
EOSINOPHIL NFR BLD AUTO: 0.2 %
ERYTHROCYTE [DISTWIDTH] IN BLOOD BY AUTOMATED COUNT: 13.2 % (ref 11.5–14.5)
GLUCOSE BLD MANUAL STRIP-MCNC: 166 MG/DL (ref 74–99)
GLUCOSE BLD MANUAL STRIP-MCNC: 88 MG/DL (ref 74–99)
GLUCOSE SERPL-MCNC: 168 MG/DL (ref 74–99)
HCT VFR BLD AUTO: 36.4 % (ref 41–52)
HGB BLD-MCNC: 11.2 G/DL (ref 13.5–17.5)
IMM GRANULOCYTES # BLD AUTO: 0.19 X10*3/UL (ref 0–0.5)
IMM GRANULOCYTES NFR BLD AUTO: 1.3 % (ref 0–0.9)
INR PPP: 1.2 (ref 0.9–1.1)
LACTATE SERPL-SCNC: 6.9 MMOL/L (ref 0.4–2)
LYMPHOCYTES # BLD AUTO: 1.38 X10*3/UL (ref 0.8–3)
LYMPHOCYTES NFR BLD AUTO: 9.2 %
MAGNESIUM SERPL-MCNC: 1.82 MG/DL (ref 1.6–2.4)
MCH RBC QN AUTO: 27.1 PG (ref 26–34)
MCHC RBC AUTO-ENTMCNC: 30.8 G/DL (ref 32–36)
MCV RBC AUTO: 88 FL (ref 80–100)
MONOCYTES # BLD AUTO: 0.94 X10*3/UL (ref 0.05–0.8)
MONOCYTES NFR BLD AUTO: 6.2 %
NEUTROPHILS # BLD AUTO: 12.49 X10*3/UL (ref 1.6–5.5)
NEUTROPHILS NFR BLD AUTO: 82.9 %
NRBC BLD-RTO: 0.1 /100 WBCS (ref 0–0)
PLATELET # BLD AUTO: 272 X10*3/UL (ref 150–450)
POTASSIUM SERPL-SCNC: 5.4 MMOL/L (ref 3.5–5.3)
PRODUCT BLOOD TYPE: 600
PRODUCT BLOOD TYPE: 6200
PRODUCT BLOOD TYPE: 6200
PRODUCT CODE: NORMAL
PROT SERPL-MCNC: 7.1 G/DL (ref 6.4–8.2)
PROTHROMBIN TIME: 12.9 SECONDS (ref 9.8–12.4)
RBC # BLD AUTO: 4.14 X10*6/UL (ref 4.5–5.9)
RH FACTOR (ANTIGEN D): NORMAL
RH FACTOR (ANTIGEN D): NORMAL
SODIUM SERPL-SCNC: 135 MMOL/L (ref 136–145)
UNIT ABO: NORMAL
UNIT NUMBER: NORMAL
UNIT RH: NORMAL
UNIT VOLUME: 204
UNIT VOLUME: 292
UNIT VOLUME: 328
UNIT VOLUME: 350
WBC # BLD AUTO: 15.1 X10*3/UL (ref 4.4–11.3)
XM INTEP: NORMAL

## 2025-07-13 PROCEDURE — 3E043XZ INTRODUCTION OF VASOPRESSOR INTO CENTRAL VEIN, PERCUTANEOUS APPROACH: ICD-10-PCS | Performed by: INTERNAL MEDICINE

## 2025-07-13 PROCEDURE — 2500000004 HC RX 250 GENERAL PHARMACY W/ HCPCS (ALT 636 FOR OP/ED)

## 2025-07-13 PROCEDURE — 36415 COLL VENOUS BLD VENIPUNCTURE: CPT | Performed by: STUDENT IN AN ORGANIZED HEALTH CARE EDUCATION/TRAINING PROGRAM

## 2025-07-13 PROCEDURE — 2500000005 HC RX 250 GENERAL PHARMACY W/O HCPCS: Performed by: INTERNAL MEDICINE

## 2025-07-13 PROCEDURE — 71045 X-RAY EXAM CHEST 1 VIEW: CPT

## 2025-07-13 PROCEDURE — 36620 INSERTION CATHETER ARTERY: CPT | Performed by: NURSE PRACTITIONER

## 2025-07-13 PROCEDURE — 36430 TRANSFUSION BLD/BLD COMPNT: CPT

## 2025-07-13 PROCEDURE — 83605 ASSAY OF LACTIC ACID: CPT | Performed by: NURSE PRACTITIONER

## 2025-07-13 PROCEDURE — 99291 CRITICAL CARE FIRST HOUR: CPT | Performed by: NURSE PRACTITIONER

## 2025-07-13 PROCEDURE — 2020000001 HC ICU ROOM DAILY

## 2025-07-13 PROCEDURE — 99291 CRITICAL CARE FIRST HOUR: CPT | Mod: 25 | Performed by: STUDENT IN AN ORGANIZED HEALTH CARE EDUCATION/TRAINING PROGRAM

## 2025-07-13 PROCEDURE — 82947 ASSAY GLUCOSE BLOOD QUANT: CPT

## 2025-07-13 PROCEDURE — 36555 INSERT NON-TUNNEL CV CATH: CPT | Performed by: NURSE PRACTITIONER

## 2025-07-13 PROCEDURE — 85025 COMPLETE CBC W/AUTO DIFF WBC: CPT | Performed by: STUDENT IN AN ORGANIZED HEALTH CARE EDUCATION/TRAINING PROGRAM

## 2025-07-13 PROCEDURE — 96374 THER/PROPH/DIAG INJ IV PUSH: CPT | Mod: 59

## 2025-07-13 PROCEDURE — 85610 PROTHROMBIN TIME: CPT | Performed by: STUDENT IN AN ORGANIZED HEALTH CARE EDUCATION/TRAINING PROGRAM

## 2025-07-13 PROCEDURE — 2500000004 HC RX 250 GENERAL PHARMACY W/ HCPCS (ALT 636 FOR OP/ED): Performed by: STUDENT IN AN ORGANIZED HEALTH CARE EDUCATION/TRAINING PROGRAM

## 2025-07-13 PROCEDURE — 2500000004 HC RX 250 GENERAL PHARMACY W/ HCPCS (ALT 636 FOR OP/ED): Performed by: NURSE PRACTITIONER

## 2025-07-13 PROCEDURE — 86923 COMPATIBILITY TEST ELECTRIC: CPT

## 2025-07-13 PROCEDURE — 87081 CULTURE SCREEN ONLY: CPT | Mod: AHULAB | Performed by: NURSE PRACTITIONER

## 2025-07-13 PROCEDURE — 71045 X-RAY EXAM CHEST 1 VIEW: CPT | Performed by: RADIOLOGY

## 2025-07-13 PROCEDURE — 80053 COMPREHEN METABOLIC PANEL: CPT | Performed by: STUDENT IN AN ORGANIZED HEALTH CARE EDUCATION/TRAINING PROGRAM

## 2025-07-13 PROCEDURE — 93005 ELECTROCARDIOGRAM TRACING: CPT

## 2025-07-13 PROCEDURE — 86900 BLOOD TYPING SEROLOGIC ABO: CPT | Performed by: STUDENT IN AN ORGANIZED HEALTH CARE EDUCATION/TRAINING PROGRAM

## 2025-07-13 PROCEDURE — 2500000002 HC RX 250 W HCPCS SELF ADMINISTERED DRUGS (ALT 637 FOR MEDICARE OP, ALT 636 FOR OP/ED): Performed by: NURSE PRACTITIONER

## 2025-07-13 PROCEDURE — 02HV33Z INSERTION OF INFUSION DEVICE INTO SUPERIOR VENA CAVA, PERCUTANEOUS APPROACH: ICD-10-PCS | Performed by: INTERNAL MEDICINE

## 2025-07-13 PROCEDURE — 96375 TX/PRO/DX INJ NEW DRUG ADDON: CPT | Mod: 59

## 2025-07-13 PROCEDURE — 96361 HYDRATE IV INFUSION ADD-ON: CPT | Mod: 59

## 2025-07-13 PROCEDURE — 83735 ASSAY OF MAGNESIUM: CPT | Performed by: STUDENT IN AN ORGANIZED HEALTH CARE EDUCATION/TRAINING PROGRAM

## 2025-07-13 PROCEDURE — P9016 RBC LEUKOCYTES REDUCED: HCPCS

## 2025-07-13 RX ORDER — PANTOPRAZOLE SODIUM 40 MG/10ML
80 INJECTION, POWDER, LYOPHILIZED, FOR SOLUTION INTRAVENOUS ONCE
Status: COMPLETED | OUTPATIENT
Start: 2025-07-13 | End: 2025-07-13

## 2025-07-13 RX ORDER — PANTOPRAZOLE SODIUM 40 MG/10ML
40 INJECTION, POWDER, LYOPHILIZED, FOR SOLUTION INTRAVENOUS 2 TIMES DAILY
Status: DISCONTINUED | OUTPATIENT
Start: 2025-07-13 | End: 2025-07-15

## 2025-07-13 RX ORDER — INSULIN LISPRO 100 [IU]/ML
0-10 INJECTION, SOLUTION INTRAVENOUS; SUBCUTANEOUS EVERY 4 HOURS
Status: DISCONTINUED | OUTPATIENT
Start: 2025-07-13 | End: 2025-07-14

## 2025-07-13 RX ORDER — DEXTROSE 50 % IN WATER (D50W) INTRAVENOUS SYRINGE
12.5
Status: DISCONTINUED | OUTPATIENT
Start: 2025-07-13 | End: 2025-07-16 | Stop reason: HOSPADM

## 2025-07-13 RX ORDER — EPINEPHRINE 0.1 MG/ML
INJECTION INTRACARDIAC; INTRAVENOUS
Status: DISPENSED
Start: 2025-07-13 | End: 2025-07-14

## 2025-07-13 RX ORDER — IBUPROFEN 100 MG/5ML
1000 SUSPENSION, ORAL (FINAL DOSE FORM) ORAL DAILY
COMMUNITY

## 2025-07-13 RX ORDER — ATROPINE SULFATE 0.1 MG/ML
INJECTION INTRAVENOUS
Status: COMPLETED
Start: 2025-07-13 | End: 2025-07-13

## 2025-07-13 RX ORDER — ONDANSETRON 4 MG/1
4 TABLET, ORALLY DISINTEGRATING ORAL EVERY 8 HOURS PRN
Status: DISCONTINUED | OUTPATIENT
Start: 2025-07-13 | End: 2025-07-16 | Stop reason: HOSPADM

## 2025-07-13 RX ORDER — MAGNESIUM SULFATE HEPTAHYDRATE 40 MG/ML
2 INJECTION, SOLUTION INTRAVENOUS ONCE
Status: COMPLETED | OUTPATIENT
Start: 2025-07-13 | End: 2025-07-13

## 2025-07-13 RX ORDER — NOREPINEPHRINE BITARTRATE/D5W 8 MG/250ML
.01-1 PLASTIC BAG, INJECTION (ML) INTRAVENOUS CONTINUOUS
Status: DISCONTINUED | OUTPATIENT
Start: 2025-07-13 | End: 2025-07-15

## 2025-07-13 RX ORDER — ONDANSETRON HYDROCHLORIDE 2 MG/ML
4 INJECTION, SOLUTION INTRAVENOUS ONCE
Status: COMPLETED | OUTPATIENT
Start: 2025-07-13 | End: 2025-07-13

## 2025-07-13 RX ORDER — ONDANSETRON HYDROCHLORIDE 2 MG/ML
4 INJECTION, SOLUTION INTRAVENOUS EVERY 8 HOURS PRN
Status: DISCONTINUED | OUTPATIENT
Start: 2025-07-13 | End: 2025-07-16 | Stop reason: HOSPADM

## 2025-07-13 RX ADMIN — PANTOPRAZOLE SODIUM 40 MG: 40 INJECTION, POWDER, FOR SOLUTION INTRAVENOUS at 21:39

## 2025-07-13 RX ADMIN — NOREPINEPHRINE BITARTRATE 0.01 MCG/KG/MIN: 8 INJECTION, SOLUTION INTRAVENOUS at 20:07

## 2025-07-13 RX ADMIN — PANTOPRAZOLE SODIUM 80 MG: 40 INJECTION, POWDER, FOR SOLUTION INTRAVENOUS at 16:03

## 2025-07-13 RX ADMIN — NOREPINEPHRINE BITARTRATE 0.06 MCG/KG/MIN: 8 INJECTION, SOLUTION INTRAVENOUS at 23:00

## 2025-07-13 RX ADMIN — ONDANSETRON 4 MG: 2 INJECTION, SOLUTION INTRAMUSCULAR; INTRAVENOUS at 16:03

## 2025-07-13 RX ADMIN — ATROPINE SULFATE 0.1 MG: 0.1 INJECTION INTRAVENOUS at 21:00

## 2025-07-13 RX ADMIN — SODIUM CHLORIDE, SODIUM LACTATE, POTASSIUM CHLORIDE, AND CALCIUM CHLORIDE 1000 ML: .6; .31; .03; .02 INJECTION, SOLUTION INTRAVENOUS at 16:43

## 2025-07-13 RX ADMIN — VASOPRESSIN 0.03 UNITS/MIN: 0.2 INJECTION INTRAVENOUS at 23:00

## 2025-07-13 RX ADMIN — MAGNESIUM SULFATE HEPTAHYDRATE 2 G: 40 INJECTION, SOLUTION INTRAVENOUS at 20:08

## 2025-07-13 RX ADMIN — VASOPRESSIN 0.03 UNITS/MIN: 0.2 INJECTION INTRAVENOUS at 21:38

## 2025-07-13 RX ADMIN — SODIUM CHLORIDE, SODIUM LACTATE, POTASSIUM CHLORIDE, AND CALCIUM CHLORIDE 1000 ML: .6; .31; .03; .02 INJECTION, SOLUTION INTRAVENOUS at 16:03

## 2025-07-13 RX ADMIN — INSULIN LISPRO 2 UNITS: 100 INJECTION, SOLUTION INTRAVENOUS; SUBCUTANEOUS at 23:54

## 2025-07-13 SDOH — ECONOMIC STABILITY: FOOD INSECURITY: WITHIN THE PAST 12 MONTHS, THE FOOD YOU BOUGHT JUST DIDN'T LAST AND YOU DIDN'T HAVE MONEY TO GET MORE.: NEVER TRUE

## 2025-07-13 SDOH — SOCIAL STABILITY: SOCIAL INSECURITY: ARE YOU OR HAVE YOU BEEN THREATENED OR ABUSED PHYSICALLY, EMOTIONALLY, OR SEXUALLY BY ANYONE?: NO

## 2025-07-13 SDOH — SOCIAL STABILITY: SOCIAL INSECURITY: HAVE YOU HAD THOUGHTS OF HARMING ANYONE ELSE?: NO

## 2025-07-13 SDOH — SOCIAL STABILITY: SOCIAL INSECURITY
WITHIN THE LAST YEAR, HAVE YOU BEEN KICKED, HIT, SLAPPED, OR OTHERWISE PHYSICALLY HURT BY YOUR PARTNER OR EX-PARTNER?: NO

## 2025-07-13 SDOH — SOCIAL STABILITY: SOCIAL INSECURITY: WERE YOU ABLE TO COMPLETE ALL THE BEHAVIORAL HEALTH SCREENINGS?: YES

## 2025-07-13 SDOH — ECONOMIC STABILITY: INCOME INSECURITY: IN THE PAST 12 MONTHS HAS THE ELECTRIC, GAS, OIL, OR WATER COMPANY THREATENED TO SHUT OFF SERVICES IN YOUR HOME?: NO

## 2025-07-13 SDOH — SOCIAL STABILITY: SOCIAL INSECURITY: HAS ANYONE EVER THREATENED TO HURT YOUR FAMILY OR YOUR PETS?: NO

## 2025-07-13 SDOH — SOCIAL STABILITY: SOCIAL INSECURITY: WITHIN THE LAST YEAR, HAVE YOU BEEN HUMILIATED OR EMOTIONALLY ABUSED IN OTHER WAYS BY YOUR PARTNER OR EX-PARTNER?: NO

## 2025-07-13 SDOH — SOCIAL STABILITY: SOCIAL INSECURITY: WITHIN THE LAST YEAR, HAVE YOU BEEN AFRAID OF YOUR PARTNER OR EX-PARTNER?: NO

## 2025-07-13 SDOH — SOCIAL STABILITY: SOCIAL INSECURITY: ARE THERE ANY APPARENT SIGNS OF INJURIES/BEHAVIORS THAT COULD BE RELATED TO ABUSE/NEGLECT?: NO

## 2025-07-13 SDOH — SOCIAL STABILITY: SOCIAL INSECURITY
WITHIN THE LAST YEAR, HAVE YOU BEEN RAPED OR FORCED TO HAVE ANY KIND OF SEXUAL ACTIVITY BY YOUR PARTNER OR EX-PARTNER?: NO

## 2025-07-13 SDOH — ECONOMIC STABILITY: FOOD INSECURITY: WITHIN THE PAST 12 MONTHS, YOU WORRIED THAT YOUR FOOD WOULD RUN OUT BEFORE YOU GOT THE MONEY TO BUY MORE.: NEVER TRUE

## 2025-07-13 SDOH — SOCIAL STABILITY: SOCIAL INSECURITY: HAVE YOU HAD ANY THOUGHTS OF HARMING ANYONE ELSE?: NO

## 2025-07-13 SDOH — SOCIAL STABILITY: SOCIAL INSECURITY: DO YOU FEEL UNSAFE GOING BACK TO THE PLACE WHERE YOU ARE LIVING?: NO

## 2025-07-13 SDOH — SOCIAL STABILITY: SOCIAL INSECURITY: ABUSE: ADULT

## 2025-07-13 SDOH — SOCIAL STABILITY: SOCIAL INSECURITY: DO YOU FEEL ANYONE HAS EXPLOITED OR TAKEN ADVANTAGE OF YOU FINANCIALLY OR OF YOUR PERSONAL PROPERTY?: NO

## 2025-07-13 SDOH — SOCIAL STABILITY: SOCIAL INSECURITY: DOES ANYONE TRY TO KEEP YOU FROM HAVING/CONTACTING OTHER FRIENDS OR DOING THINGS OUTSIDE YOUR HOME?: NO

## 2025-07-13 ASSESSMENT — COGNITIVE AND FUNCTIONAL STATUS - GENERAL
PATIENT BASELINE BEDBOUND: NO
MOBILITY SCORE: 24
DAILY ACTIVITIY SCORE: 24

## 2025-07-13 ASSESSMENT — ENCOUNTER SYMPTOMS
FATIGUE: 1
BLOOD IN STOOL: 1
DIZZINESS: 1
HEMATOLOGIC/LYMPHATIC NEGATIVE: 1
ALLERGIC/IMMUNOLOGIC NEGATIVE: 1
ENDOCRINE NEGATIVE: 1
MUSCULOSKELETAL NEGATIVE: 1
PSYCHIATRIC NEGATIVE: 1

## 2025-07-13 ASSESSMENT — ACTIVITIES OF DAILY LIVING (ADL)
LACK_OF_TRANSPORTATION: NO
WALKS IN HOME: INDEPENDENT
GROOMING: INDEPENDENT
PATIENT'S MEMORY ADEQUATE TO SAFELY COMPLETE DAILY ACTIVITIES?: YES
FEEDING YOURSELF: INDEPENDENT
ADEQUATE_TO_COMPLETE_ADL: YES
HEARING - LEFT EAR: FUNCTIONAL
DRESSING YOURSELF: INDEPENDENT
TOILETING: INDEPENDENT
ASSISTIVE_DEVICE: EYEGLASSES
JUDGMENT_ADEQUATE_SAFELY_COMPLETE_DAILY_ACTIVITIES: YES
BATHING: INDEPENDENT
HEARING - RIGHT EAR: FUNCTIONAL

## 2025-07-13 ASSESSMENT — PAIN SCALES - GENERAL
PAINLEVEL_OUTOF10: 0 - NO PAIN
PAINLEVEL_OUTOF10: 0 - NO PAIN

## 2025-07-13 ASSESSMENT — LIFESTYLE VARIABLES
HOW MANY STANDARD DRINKS CONTAINING ALCOHOL DO YOU HAVE ON A TYPICAL DAY: PATIENT DOES NOT DRINK
AUDIT-C TOTAL SCORE: 0
AUDIT-C TOTAL SCORE: 0
HOW OFTEN DO YOU HAVE A DRINK CONTAINING ALCOHOL: NEVER
HOW OFTEN DO YOU HAVE 6 OR MORE DRINKS ON ONE OCCASION: NEVER
SKIP TO QUESTIONS 9-10: 1

## 2025-07-13 ASSESSMENT — PAIN - FUNCTIONAL ASSESSMENT
PAIN_FUNCTIONAL_ASSESSMENT: 0-10
PAIN_FUNCTIONAL_ASSESSMENT: 0-10

## 2025-07-13 ASSESSMENT — PATIENT HEALTH QUESTIONNAIRE - PHQ9
SUM OF ALL RESPONSES TO PHQ9 QUESTIONS 1 & 2: 0
2. FEELING DOWN, DEPRESSED OR HOPELESS: NOT AT ALL
1. LITTLE INTEREST OR PLEASURE IN DOING THINGS: NOT AT ALL

## 2025-07-13 NOTE — ED PROVIDER NOTES
Emergency Department Provider Note        History of Present Illness     Chief Complaint: blood in stool, vomiting blood   History provided by: Patient  Limitations to History: None  External Chart Review: See ED Course    HPI:  Juan Alberto Salcido is a 75 y.o. male with PMHx of hypertension, hyperlipidemia, type 2 diabetes presenting to the ED for hematemesis.  Patient reports yesterday he began having coffee-ground emesis and melena.  Today he has continued to have both of these but is now also having bright red blood in his vomit and stool.  He is not on any anticoagulation.  Reports similar episode many years ago that was secondary to a duodenal ulcer.  He denies any abdominal pain currently.  Does not frequently take NSAIDs.  Reports feeling generally weak and fatigued over this time.  As well.  Denies chest pain, shortness of breath, syncope.    Physical Exam   Triage vitals:  T 36.1 °C (96.9 °F)  HR (!) 103  BP 70/50  RR 18  O2 99 %      Constitutional: Awake, alert, not in acute distress  HENT: Head atraumatic, mucous membranes moist  Eyes:  Conjunctivae pale  Neck:  Supple  Lungs: Clear to auscultation, breath sounds equal and symmetric, no wheezes, rales, or rhonchi  Heart: Tachycardic rate regular rhythm, no murmur, rub or gallop  Abdomen: Soft, nontender, nondistended, no rebound or guarding  Extremities: No lower extremity edema  Neuro: Alert, no focal deficit  Skin: Warm, dry  Psych: Calm, cooperative       Medical Decision Making & ED Course   Medical Decision Making:  Juan Alberto Salcido is a 75 y.o. male with PMHx as above in HPI who presented to the ED for hematemesis and melena. Patient was afebrile, hypotensive, and satting on room air on arrival.     Exam as above.    ED Course as of 07/13/25 1734   Sun Jul 13, 2025   1540 External chart review:  PCP office visit 7/13/25  Notes hx HTN, HLD, T2DM     [SS]   1614 CBC and Auto Differential(!)  Leukocytosis, new anemia, no thrombocytopenia [SS]   1637  Comprehensive metabolic panel(!)  FLORENTINO, mild hyperK. No signs of acute liver injury or obstructive biliary pathology [SS]   1637 Magnesium  normal [SS]   1653 ECG 12 lead  I have personally reviewed and interpreted this EKG.  Normal sinus rhythm, rate 98 BPM  Normal axis  AR interval and QRS duration within normal limits.  QTc within normal limits.  No signs of acute ischemia or infarction [SS]   1716 Personally discussed currently available facts and concerns about the case with GI, who advises will eval pt, plan for endoscopy   [SS]   1725 Personally discussed currently available facts and concerns about the case with Dr Nina, who advises accepts to ICU   [SS]      ED Course User Index  [SS] Steffen Simerlink, MD         Diagnoses as of 07/13/25 1734   Hematemesis with nausea   Melena   Blood loss anemia   FLORENTINO (acute kidney injury)       Labs as above, notable for new anemia suspected secondary to ongoing GI bleed.  Currently suspect upper GI bleed.  Labs also show FLORENTINO and a slight hyperkalemia.  No EKG changes.  He was given a dose of Protonix.  He was given 2 L of IV fluids and remains borderline hypotensive though mental status remains appropriate.  He was consented for blood transfusion, will plan to administer 2 units.  Discussed with GI who will plan for endoscopy.  Discussed with ICU who accepts to the ICU for close monitoring.  ------------------------------------------------  Independent Test Interpretation: See ED Course  Personal Discussion with Other Providers: See ED Course    Disposition   As a result of their workup, the patient will require admission to the hospital.  The patient was informed of his diagnosis.  The patient was given the opportunity to ask questions and I answered them. The patient agreed to be admitted to the hospital.    Procedures   Critical Care    Performed by: Steffen Simerlink, MD  Authorized by: Steffen Simerlink, MD    Critical care provider statement:     Critical care  time (minutes):  35    Critical care time was exclusive of:  Separately billable procedures and treating other patients    Critical care was necessary to treat or prevent imminent or life-threatening deterioration of the following conditions:  Circulatory failure and shock    Critical care was time spent personally by me on the following activities:  Ordering and performing treatments and interventions, ordering and review of laboratory studies, ordering and review of radiographic studies, re-evaluation of patient's condition, review of old charts, obtaining history from patient or surrogate, examination of patient, evaluation of patient's response to treatment and development of treatment plan with patient or surrogate    Care discussed with: admitting provider        Steffen Simerlink, MD Steffen Simerlink, MD  07/13/25 3264

## 2025-07-13 NOTE — PROGRESS NOTES
Pharmacy Medication History     Source of Information: Per patient and wife bedside     Additional concerns with the patient's PTA list.   N/a  Notified Provider via Haiku : No    The following updates were made to the Prior to Admission medication list:     Medications ADDED:   Vitamin C   Medications CHANGED:  N/a  Medications REMOVED:   N/a  Medications NOT TAKING:   N/a    Allergy reviewed : Yes    Meds 2 Beds : No    Outpatient pharmacy confirmed and updated in chart : Yes    Pharmacy name: [facility]     The list below reflectives the updated PTA list. Please review each medication in order reconciliation for additional clarification and justification.    Prior to Admission Medications   Prescriptions Last Dose   SITagliptin phosphate (Januvia) 100 mg tablet 7/13/2025 Morning   Sig: Take 1 tablet (100 mg) by mouth once daily.   ascorbic acid (Vitamin C) 1,000 mg tablet 7/13/2025 Morning   Sig: Take 1 tablet (1,000 mg) by mouth once daily.   aspirin 81 mg EC tablet 7/13/2025 Morning   Sig: Take 1 tablet (81 mg) by mouth once daily.   atenolol (Tenormin) 50 mg tablet 7/13/2025 Morning   Sig: TAKE ONE TABLET BY MOUTH EVERY DAY   dapagliflozin propanediol (Farxiga) 10 mg tablet 7/13/2025 Morning   Sig: Take 1 tablet (10 mg) by mouth once daily.   glipiZIDE (Glucotrol) 5 mg tablet 7/13/2025 Morning   Sig: TAKE TWO TABLETS BY MOUTH IN THE MORNING AND ONE TABLET WITH DINNER   Patient taking differently: Take 1-2 tablets (5-10 mg) by mouth 2 times a day before meals. TAKE TWO TABLETS BY MOUTH IN THE MORNING AND ONE TABLET WITH DINNER   hydroCHLOROthiazide (HYDRODiuril) 25 mg tablet 7/13/2025 Morning   Sig: TAKE ONE-HALF TABLET BY MOUTH ONCE DAILY   latanoprost (Xalatan) 0.005 % ophthalmic solution 7/12/2025 Evening   Sig: Administer 1 drop into both eyes once daily at bedtime. Entered during TW abstraction - no directions provided   lisinopril 20 mg tablet 7/12/2025 Evening   Sig: TAKE ONE TABLET BY MOUTH  EVERY DAY   lovastatin (Mevacor) 20 mg tablet 7/12/2025 Evening   Sig: TAKE ONE TABLET BY MOUTH EVERY DAY   metFORMIN (Glucophage) 1,000 mg tablet 7/13/2025 Morning   Sig: TAKE ONE TABLET BY MOUTH TWO TIMES A DAY   sildenafil (Viagra) 100 mg tablet    Sig: Take 1 tablet (100 mg) by mouth if needed for erectile dysfunction.   timolol (Timoptic) 0.5 % ophthalmic solution 7/13/2025 Morning   Sig: Administer 1 drop into both eyes 2 times a day. Entered during TW abstraction - no directions provided      Facility-Administered Medications: None       The list below reflectives the updated allergy list. Please review each documented allergy for additional clarification and justification.    No Known Allergies       07/13/25 at 5:17 PM - Candi Baron

## 2025-07-13 NOTE — H&P
History Of Present Illness  Juan Alberto Salcido is a 75 y.o. male presenting with coffee ground emesis and melena x 2 days. He reports last vomiting and having a bloody bowel movement this morning. Patient also presents with hypotension, he last took his lisinopril last night but his atenolol and hydrochlorothiazide this morning. He denies drinking alcohol and does not take NSAIDs frequently. He denies abdominal pain/ tenderness or distention. He takes aspirin 81mg daily which was last taken this morning. Hemoglobin currently 11.2 with a baseline of 14. Two units of blood have been transfused. Patient also presents with an FLORENTINO, slightly hyperkalemic at 5.4 and creatinine at 1.96 (baseline of .88-1.18). admitted into the ICU for hemorrhagic shock and further monitoring.     Past Medical History  Hypertension, hyperlipidemia, and type 2 diabetes.    Surgical History  Surgical History[1]     Social History  He reports that he has never smoked. He has never been exposed to tobacco smoke. He has never used smokeless tobacco. He reports that he does not drink alcohol and does not use drugs.    Family History  Family History[2]     Allergies  Patient has no known allergies.    Review of Systems   Constitutional:  Positive for fatigue.   HENT: Negative.     Gastrointestinal:  Positive for blood in stool.   Endocrine: Negative.    Genitourinary: Negative.    Musculoskeletal: Negative.    Skin: Negative.    Allergic/Immunologic: Negative.    Neurological:  Positive for dizziness.   Hematological: Negative.    Psychiatric/Behavioral: Negative.          Physical Exam  Constitutional:       Appearance: Normal appearance.   HENT:      Head: Atraumatic.      Mouth/Throat:      Mouth: Mucous membranes are dry.   Eyes:      Extraocular Movements: Extraocular movements intact.      Pupils: Pupils are equal, round, and reactive to light.   Cardiovascular:      Rate and Rhythm: Normal rate and regular rhythm.   Pulmonary:      Breath  "sounds: Normal breath sounds.   Abdominal:      Palpations: Abdomen is soft.   Musculoskeletal:         General: No swelling.   Skin:     General: Skin is warm and dry.   Neurological:      General: No focal deficit present.      Mental Status: He is alert and oriented to person, place, and time.           Last Recorded Vitals  Blood pressure 86/54, pulse 101, temperature 36.4 °C (97.5 °F), temperature source Temporal, resp. rate 17, height 1.854 m (6' 1\"), weight 83.9 kg (185 lb), SpO2 98%.    Relevant Results    Scheduled medications  Scheduled Medications[3]  Continuous medications  Continuous Medications[4]  PRN medications  PRN Medications[5]        Results for orders placed or performed during the hospital encounter of 07/13/25 (from the past 24 hours)   CBC and Auto Differential   Result Value Ref Range    WBC 15.1 (H) 4.4 - 11.3 x10*3/uL    nRBC 0.1 (H) 0.0 - 0.0 /100 WBCs    RBC 4.14 (L) 4.50 - 5.90 x10*6/uL    Hemoglobin 11.2 (L) 13.5 - 17.5 g/dL    Hematocrit 36.4 (L) 41.0 - 52.0 %    MCV 88 80 - 100 fL    MCH 27.1 26.0 - 34.0 pg    MCHC 30.8 (L) 32.0 - 36.0 g/dL    RDW 13.2 11.5 - 14.5 %    Platelets 272 150 - 450 x10*3/uL    Neutrophils % 82.9 40.0 - 80.0 %    Immature Granulocytes %, Automated 1.3 (H) 0.0 - 0.9 %    Lymphocytes % 9.2 13.0 - 44.0 %    Monocytes % 6.2 2.0 - 10.0 %    Eosinophils % 0.2 0.0 - 6.0 %    Basophils % 0.2 0.0 - 2.0 %    Neutrophils Absolute 12.49 (H) 1.60 - 5.50 x10*3/uL    Immature Granulocytes Absolute, Automated 0.19 0.00 - 0.50 x10*3/uL    Lymphocytes Absolute 1.38 0.80 - 3.00 x10*3/uL    Monocytes Absolute 0.94 (H) 0.05 - 0.80 x10*3/uL    Eosinophils Absolute 0.03 0.00 - 0.40 x10*3/uL    Basophils Absolute 0.03 0.00 - 0.10 x10*3/uL   Comprehensive metabolic panel   Result Value Ref Range    Glucose 168 (H) 74 - 99 mg/dL    Sodium 135 (L) 136 - 145 mmol/L    Potassium 5.4 (H) 3.5 - 5.3 mmol/L    Chloride 101 98 - 107 mmol/L    Bicarbonate 17 (L) 21 - 32 mmol/L    Anion Gap " 22 (H) 10 - 20 mmol/L    Urea Nitrogen 77 (H) 6 - 23 mg/dL    Creatinine 1.96 (H) 0.50 - 1.30 mg/dL    eGFR 35 (L) >60 mL/min/1.73m*2    Calcium 9.5 8.6 - 10.3 mg/dL    Albumin 4.0 3.4 - 5.0 g/dL    Alkaline Phosphatase 46 33 - 136 U/L    Total Protein 7.1 6.4 - 8.2 g/dL    AST 10 9 - 39 U/L    Bilirubin, Total 0.4 0.0 - 1.2 mg/dL    ALT 12 10 - 52 U/L   Magnesium   Result Value Ref Range    Magnesium 1.82 1.60 - 2.40 mg/dL   Protime-INR   Result Value Ref Range    Protime 12.9 (H) 9.8 - 12.4 seconds    INR 1.2 (H) 0.9 - 1.1   Type And Screen   Result Value Ref Range    ABO TYPE A     Rh TYPE NEG     ANTIBODY SCREEN NEG    Prepare RBC: 2 Units   Result Value Ref Range    PRODUCT CODE S6644N04     Unit Number [id number]     Unit ABO A     Unit RH NEG     XM INTEP COMP     Dispense Status TR     Blood Expiration Date 8/14/2025 11:59:00 PM EDT     PRODUCT BLOOD TYPE 0600     UNIT VOLUME 350     PRODUCT CODE A3553L52     Unit Number [id number]     Unit ABO A     Unit RH NEG     XM INTEP COMP     Dispense Status TR     Blood Expiration Date 8/14/2025 11:59:00 PM EDT     PRODUCT BLOOD TYPE 0600     UNIT VOLUME 350    VERIFY ABO/Rh Group Test   Result Value Ref Range    ABO TYPE A     Rh TYPE NEG    Lactate   Result Value Ref Range    Lactate 6.9 (HH) 0.4 - 2.0 mmol/L   POCT GLUCOSE   Result Value Ref Range    POCT Glucose 88 74 - 99 mg/dL        Assessment & Plan  Hematemesis with nausea  Acute gastrointestinal bleed  Hemorrhagic shock d/t GI bleed - Hemoglobin currently 11.2 with a baseline of 14. Pt hypotensive - levophed running at 0.08  Lactic acidosis  FLORENTINO -  Creatinine 1.96 (baseline of  (0.88-1.18)  slightly hyperkalemic at 5.4      Neurological:  -AO x4  -CAM ICU score q-shift  -Sleep/wake cycle hygiene    Pulmonary:  -Room air, maintain SpO2 > 92%  -Pulmonary toileting    CV:  -Start levophed, MAP goal > 65  -Continuous cardiac monitoring and hourly vital signs  -Electrolytes daily and replete as  needed    -GI/FEN/Endo:  -Strict NPO at midnight for endoscopy  -Trend lactate  -PPI 40mg BID  -Consult GI for EGD either tonight or tomorrow morning  -s/p 2 units PRBCs,  transfuse <7.0  -Monitor and trend H&H  -PCOT q4, insulin sliding scale ordered w/ hypoglycemia protocol    Renal/ :  -Strict I&O's  -Daily weights  -Daily RFP  -Avoid nephrotoxic agents, NSAIDs and renal dose medications    Heme/ID  -anemia panel  -SCDs    Msk:  -Repositioning    ICU checklist:  Lines: arterial line, PIV  Code: FULL  GI prophylaxis: Protonix  DVT prophylaxis: SCDs    Plan discussed with Dr. Ramón Rubio, family at the bedside and RN.    Daily Devries, RN, [employer] MSN Student    I was present with the [employer]IOBHAN student who participated in the documentation of this note. I personally saw and evaluated the patient and performed my own history and examination. I discussed the case with the SIOBHAN student. I have reviewed, verified, and revised the note as necessary and agree with the content and plan as written by the [employer]IOBH[employer] student.    Sujit Sanabria APRN-CNP   Time spent on the assessment of patient, gathering and interpreting data, review of medical record/patient history, personally reviewing radiographic imaging and formulation of this note. With greater than 50% spent in personal discussion with patient/ family.  Time:75         [1]   Past Surgical History:  Procedure Laterality Date    OTHER SURGICAL HISTORY  03/02/2021    Shoulder surgery   [2] No family history on file.  [3] atropine, , ,   EPINEPHrine, , ,   insulin lispro, 0-10 Units, subcutaneous, q4h  magnesium sulfate, 2 g, intravenous, Once  pantoprazole, 40 mg, intravenous, BID  [4] norepinephrine, 0.01-1 mcg/kg/min, Last Rate: 0.09 mcg/kg/min (07/13/25 2041)  [5] PRN medications: atropine, EPINEPHrine, dextrose, glucagon, ondansetron ODT **OR** ondansetron, oxygen

## 2025-07-13 NOTE — ED TRIAGE NOTES
Presents to the ED with vomiting blood twice, and blood in stool x 2 days. Denies abdominal pain at this time. Denies urinary symptoms at this time. Patient states that he feels weak, with some dizziness.

## 2025-07-14 ENCOUNTER — APPOINTMENT (OUTPATIENT)
Dept: GASTROENTEROLOGY | Facility: HOSPITAL | Age: 75
End: 2025-07-14
Payer: MEDICARE

## 2025-07-14 VITALS
OXYGEN SATURATION: 95 % | HEIGHT: 73 IN | DIASTOLIC BLOOD PRESSURE: 65 MMHG | WEIGHT: 185 LBS | RESPIRATION RATE: 21 BRPM | HEART RATE: 97 BPM | SYSTOLIC BLOOD PRESSURE: 75 MMHG | BODY MASS INDEX: 24.52 KG/M2 | TEMPERATURE: 98.6 F

## 2025-07-14 LAB
ALBUMIN SERPL BCP-MCNC: 3.8 G/DL (ref 3.4–5)
ALP SERPL-CCNC: 44 U/L (ref 33–136)
ALT SERPL W P-5'-P-CCNC: 19 U/L (ref 10–52)
ANION GAP BLDV CALCULATED.4IONS-SCNC: 12 MMOL/L (ref 10–25)
ANION GAP SERPL CALC-SCNC: 12 MMOL/L (ref 10–20)
AST SERPL W P-5'-P-CCNC: 16 U/L (ref 9–39)
ATRIAL RATE: 98 BPM
BASE EXCESS BLDV CALC-SCNC: 0.3 MMOL/L (ref -2–3)
BASOPHILS # BLD AUTO: 0.02 X10*3/UL (ref 0–0.1)
BASOPHILS NFR BLD AUTO: 0.2 %
BILIRUB SERPL-MCNC: 1 MG/DL (ref 0–1.2)
BLOOD EXPIRATION DATE: NORMAL
BODY TEMPERATURE: 37 DEGREES CELSIUS
BUN SERPL-MCNC: 64 MG/DL (ref 6–23)
CA-I BLDV-SCNC: 1.15 MMOL/L (ref 1.1–1.33)
CALCIUM SERPL-MCNC: 9 MG/DL (ref 8.6–10.3)
CHLORIDE BLDV-SCNC: 101 MMOL/L (ref 98–107)
CHLORIDE SERPL-SCNC: 104 MMOL/L (ref 98–107)
CO2 SERPL-SCNC: 26 MMOL/L (ref 21–32)
COMPONENT CODE: NORMAL
CREAT SERPL-MCNC: 1.29 MG/DL (ref 0.5–1.3)
DISPENSE STATUS: NORMAL
EGFRCR SERPLBLD CKD-EPI 2021: 58 ML/MIN/1.73M*2
EOSINOPHIL # BLD AUTO: 0.04 X10*3/UL (ref 0–0.4)
EOSINOPHIL NFR BLD AUTO: 0.5 %
ERYTHROCYTE [DISTWIDTH] IN BLOOD BY AUTOMATED COUNT: 15 % (ref 11.5–14.5)
ERYTHROCYTE [DISTWIDTH] IN BLOOD BY AUTOMATED COUNT: 15.2 % (ref 11.5–14.5)
GLUCOSE BLD MANUAL STRIP-MCNC: 128 MG/DL (ref 74–99)
GLUCOSE BLD MANUAL STRIP-MCNC: 158 MG/DL (ref 74–99)
GLUCOSE BLD MANUAL STRIP-MCNC: 158 MG/DL (ref 74–99)
GLUCOSE BLD MANUAL STRIP-MCNC: 177 MG/DL (ref 74–99)
GLUCOSE BLD MANUAL STRIP-MCNC: 215 MG/DL (ref 74–99)
GLUCOSE BLD MANUAL STRIP-MCNC: 252 MG/DL (ref 74–99)
GLUCOSE BLDV-MCNC: 124 MG/DL (ref 74–99)
GLUCOSE SERPL-MCNC: 93 MG/DL (ref 74–99)
HCO3 BLDV-SCNC: 25.4 MMOL/L (ref 22–26)
HCT VFR BLD AUTO: 35 % (ref 41–52)
HCT VFR BLD AUTO: 35.5 % (ref 41–52)
HCT VFR BLD EST: 36 % (ref 41–52)
HGB BLD-MCNC: 11.6 G/DL (ref 13.5–17.5)
HGB BLD-MCNC: 11.7 G/DL (ref 13.5–17.5)
HGB BLDV-MCNC: 12.1 G/DL (ref 13.5–17.5)
IMM GRANULOCYTES # BLD AUTO: 0.07 X10*3/UL (ref 0–0.5)
IMM GRANULOCYTES NFR BLD AUTO: 0.9 % (ref 0–0.9)
INHALED O2 CONCENTRATION: 21 %
LACTATE BLDV-SCNC: 1.7 MMOL/L (ref 0.4–2)
LACTATE SERPL-SCNC: 1.2 MMOL/L (ref 0.4–2)
LYMPHOCYTES # BLD AUTO: 1.45 X10*3/UL (ref 0.8–3)
LYMPHOCYTES NFR BLD AUTO: 17.9 %
MCH RBC QN AUTO: 27.7 PG (ref 26–34)
MCH RBC QN AUTO: 28 PG (ref 26–34)
MCHC RBC AUTO-ENTMCNC: 32.7 G/DL (ref 32–36)
MCHC RBC AUTO-ENTMCNC: 33.4 G/DL (ref 32–36)
MCV RBC AUTO: 83 FL (ref 80–100)
MCV RBC AUTO: 86 FL (ref 80–100)
MONOCYTES # BLD AUTO: 1.02 X10*3/UL (ref 0.05–0.8)
MONOCYTES NFR BLD AUTO: 12.6 %
NEUTROPHILS # BLD AUTO: 5.49 X10*3/UL (ref 1.6–5.5)
NEUTROPHILS NFR BLD AUTO: 67.9 %
NRBC BLD-RTO: 0 /100 WBCS (ref 0–0)
NRBC BLD-RTO: 0 /100 WBCS (ref 0–0)
OXYHGB MFR BLDV: 71.9 % (ref 45–75)
P AXIS: 64 DEGREES
P OFFSET: 199 MS
P ONSET: 155 MS
PCO2 BLDV: 42 MM HG (ref 41–51)
PH BLDV: 7.39 PH (ref 7.33–7.43)
PLATELET # BLD AUTO: 145 X10*3/UL (ref 150–450)
PLATELET # BLD AUTO: 146 X10*3/UL (ref 150–450)
PO2 BLDV: 39 MM HG (ref 35–45)
POTASSIUM BLDV-SCNC: 4.2 MMOL/L (ref 3.5–5.3)
POTASSIUM SERPL-SCNC: 4.1 MMOL/L (ref 3.5–5.3)
PR INTERVAL: 140 MS
PRODUCT BLOOD TYPE: 6200
PRODUCT CODE: NORMAL
PROT SERPL-MCNC: 6.4 G/DL (ref 6.4–8.2)
Q ONSET: 225 MS
QRS COUNT: 16 BEATS
QRS DURATION: 72 MS
QT INTERVAL: 358 MS
QTC CALCULATION(BAZETT): 457 MS
QTC FREDERICIA: 421 MS
R AXIS: 53 DEGREES
RBC # BLD AUTO: 4.15 X10*6/UL (ref 4.5–5.9)
RBC # BLD AUTO: 4.23 X10*6/UL (ref 4.5–5.9)
REFLEX ADDED, ANEMIA PANEL: NORMAL
SAO2 % BLDV: 74 % (ref 45–75)
SODIUM BLDV-SCNC: 134 MMOL/L (ref 136–145)
SODIUM SERPL-SCNC: 138 MMOL/L (ref 136–145)
T AXIS: 60 DEGREES
T OFFSET: 404 MS
UNIT ABO: NORMAL
UNIT NUMBER: NORMAL
UNIT RH: NORMAL
UNIT VOLUME: 204
UNIT VOLUME: 222
UNIT VOLUME: 227
UNIT VOLUME: 328
VENTRICULAR RATE: 98 BPM
WBC # BLD AUTO: 7.3 X10*3/UL (ref 4.4–11.3)
WBC # BLD AUTO: 8.1 X10*3/UL (ref 4.4–11.3)

## 2025-07-14 PROCEDURE — 2020000001 HC ICU ROOM DAILY

## 2025-07-14 PROCEDURE — 85025 COMPLETE CBC W/AUTO DIFF WBC: CPT | Performed by: HOSPITALIST

## 2025-07-14 PROCEDURE — 0DB68ZX EXCISION OF STOMACH, VIA NATURAL OR ARTIFICIAL OPENING ENDOSCOPIC, DIAGNOSTIC: ICD-10-PCS | Performed by: INTERNAL MEDICINE

## 2025-07-14 PROCEDURE — 82947 ASSAY GLUCOSE BLOOD QUANT: CPT

## 2025-07-14 PROCEDURE — 2500000004 HC RX 250 GENERAL PHARMACY W/ HCPCS (ALT 636 FOR OP/ED)

## 2025-07-14 PROCEDURE — 37799 UNLISTED PX VASCULAR SURGERY: CPT | Performed by: NURSE PRACTITIONER

## 2025-07-14 PROCEDURE — 2500000004 HC RX 250 GENERAL PHARMACY W/ HCPCS (ALT 636 FOR OP/ED): Mod: JZ | Performed by: NURSE PRACTITIONER

## 2025-07-14 PROCEDURE — 84132 ASSAY OF SERUM POTASSIUM: CPT | Performed by: HOSPITALIST

## 2025-07-14 PROCEDURE — 88305 TISSUE EXAM BY PATHOLOGIST: CPT | Mod: TC,AHULAB | Performed by: INTERNAL MEDICINE

## 2025-07-14 PROCEDURE — 85027 COMPLETE CBC AUTOMATED: CPT | Performed by: NURSE PRACTITIONER

## 2025-07-14 PROCEDURE — 43239 EGD BIOPSY SINGLE/MULTIPLE: CPT

## 2025-07-14 PROCEDURE — 84132 ASSAY OF SERUM POTASSIUM: CPT | Performed by: NURSE PRACTITIONER

## 2025-07-14 PROCEDURE — 99221 1ST HOSP IP/OBS SF/LOW 40: CPT | Performed by: NURSE PRACTITIONER

## 2025-07-14 PROCEDURE — 43239 EGD BIOPSY SINGLE/MULTIPLE: CPT | Performed by: INTERNAL MEDICINE

## 2025-07-14 PROCEDURE — 36430 TRANSFUSION BLD/BLD COMPNT: CPT

## 2025-07-14 PROCEDURE — 36415 COLL VENOUS BLD VENIPUNCTURE: CPT | Performed by: NURSE PRACTITIONER

## 2025-07-14 PROCEDURE — 2500000002 HC RX 250 W HCPCS SELF ADMINISTERED DRUGS (ALT 637 FOR MEDICARE OP, ALT 636 FOR OP/ED): Performed by: NURSE PRACTITIONER

## 2025-07-14 PROCEDURE — 2500000001 HC RX 250 WO HCPCS SELF ADMINISTERED DRUGS (ALT 637 FOR MEDICARE OP): Performed by: NURSE PRACTITIONER

## 2025-07-14 PROCEDURE — P9017 PLASMA 1 DONOR FRZ W/IN 8 HR: HCPCS

## 2025-07-14 PROCEDURE — 83605 ASSAY OF LACTIC ACID: CPT | Performed by: NURSE PRACTITIONER

## 2025-07-14 RX ORDER — INSULIN LISPRO 100 [IU]/ML
0-10 INJECTION, SOLUTION INTRAVENOUS; SUBCUTANEOUS
Status: DISCONTINUED | OUTPATIENT
Start: 2025-07-15 | End: 2025-07-16 | Stop reason: HOSPADM

## 2025-07-14 RX ORDER — FENTANYL CITRATE 50 UG/ML
INJECTION, SOLUTION INTRAMUSCULAR; INTRAVENOUS
Status: COMPLETED
Start: 2025-07-14 | End: 2025-07-14

## 2025-07-14 RX ORDER — MIDAZOLAM HYDROCHLORIDE 1 MG/ML
INJECTION, SOLUTION INTRAMUSCULAR; INTRAVENOUS
Status: COMPLETED
Start: 2025-07-14 | End: 2025-07-14

## 2025-07-14 RX ORDER — PRAVASTATIN SODIUM 20 MG/1
20 TABLET ORAL NIGHTLY
Status: DISCONTINUED | OUTPATIENT
Start: 2025-07-14 | End: 2025-07-16 | Stop reason: HOSPADM

## 2025-07-14 RX ORDER — LOVASTATIN 20 MG/1
20 TABLET ORAL DAILY
Status: DISCONTINUED | OUTPATIENT
Start: 2025-07-14 | End: 2025-07-14

## 2025-07-14 RX ORDER — FAMOTIDINE 10 MG/ML
20 INJECTION, SOLUTION INTRAVENOUS ONCE
Status: COMPLETED | OUTPATIENT
Start: 2025-07-14 | End: 2025-07-14

## 2025-07-14 RX ORDER — DIPHENHYDRAMINE HYDROCHLORIDE 50 MG/ML
25 INJECTION, SOLUTION INTRAMUSCULAR; INTRAVENOUS ONCE
Status: COMPLETED | OUTPATIENT
Start: 2025-07-14 | End: 2025-07-14

## 2025-07-14 RX ORDER — DIPHENHYDRAMINE HYDROCHLORIDE 50 MG/ML
25 INJECTION, SOLUTION INTRAMUSCULAR; INTRAVENOUS EVERY 4 HOURS PRN
Status: DISCONTINUED | OUTPATIENT
Start: 2025-07-14 | End: 2025-07-16 | Stop reason: HOSPADM

## 2025-07-14 RX ORDER — ATROPINE SULFATE 0.1 MG/ML
0.1 INJECTION INTRAVENOUS ONCE
Status: DISCONTINUED | OUTPATIENT
Start: 2025-07-13 | End: 2025-07-14

## 2025-07-14 RX ADMIN — MIDAZOLAM 4 MG: 1 INJECTION INTRAMUSCULAR; INTRAVENOUS at 08:59

## 2025-07-14 RX ADMIN — INSULIN LISPRO 2 UNITS: 100 INJECTION, SOLUTION INTRAVENOUS; SUBCUTANEOUS at 08:03

## 2025-07-14 RX ADMIN — DIPHENHYDRAMINE HYDROCHLORIDE 25 MG: 50 INJECTION, SOLUTION INTRAMUSCULAR; INTRAVENOUS at 03:19

## 2025-07-14 RX ADMIN — INSULIN LISPRO 2 UNITS: 100 INJECTION, SOLUTION INTRAVENOUS; SUBCUTANEOUS at 11:13

## 2025-07-14 RX ADMIN — FENTANYL CITRATE 25 MCG: 0.05 INJECTION, SOLUTION INTRAMUSCULAR; INTRAVENOUS at 08:59

## 2025-07-14 RX ADMIN — PRAVASTATIN SODIUM 20 MG: 20 TABLET ORAL at 20:35

## 2025-07-14 RX ADMIN — PANTOPRAZOLE SODIUM 40 MG: 40 INJECTION, POWDER, FOR SOLUTION INTRAVENOUS at 20:35

## 2025-07-14 RX ADMIN — METHYLPREDNISOLONE SODIUM SUCCINATE 40 MG: 40 INJECTION, POWDER, FOR SOLUTION INTRAMUSCULAR; INTRAVENOUS at 03:33

## 2025-07-14 RX ADMIN — FAMOTIDINE 20 MG: 10 INJECTION, SOLUTION INTRAVENOUS at 03:33

## 2025-07-14 RX ADMIN — PANTOPRAZOLE SODIUM 40 MG: 40 INJECTION, POWDER, FOR SOLUTION INTRAVENOUS at 08:03

## 2025-07-14 RX ADMIN — INSULIN LISPRO 2 UNITS: 100 INJECTION, SOLUTION INTRAVENOUS; SUBCUTANEOUS at 15:32

## 2025-07-14 ASSESSMENT — PAIN SCALES - GENERAL
PAINLEVEL_OUTOF10: 0 - NO PAIN

## 2025-07-14 ASSESSMENT — PAIN - FUNCTIONAL ASSESSMENT
PAIN_FUNCTIONAL_ASSESSMENT: 0-10

## 2025-07-14 ASSESSMENT — ACTIVITIES OF DAILY LIVING (ADL): LACK_OF_TRANSPORTATION: NO

## 2025-07-14 ASSESSMENT — COGNITIVE AND FUNCTIONAL STATUS - GENERAL
MOBILITY SCORE: 24
DAILY ACTIVITIY SCORE: 24
DAILY ACTIVITIY SCORE: 24
MOBILITY SCORE: 24

## 2025-07-14 NOTE — PROGRESS NOTES
07/14/25 1103   Discharge Planning   Living Arrangements Spouse/significant other   Support Systems Spouse/significant other   Assistance Needed met with patient and wife, patient asleep, spoke to wife. from home with wife, indep. with all, dr banuelos  is pcp, able to get to appts and afford meds   Type of Residence Private residence   Number of Stairs to Enter Residence 1   Number of Stairs Within Residence 12   Do you have animals or pets at home? No   Home or Post Acute Services None   Expected Discharge Disposition Home   Financial Resource Strain   How hard is it for you to pay for the very basics like food, housing, medical care, and heating? Not hard   Housing Stability   In the last 12 months, was there a time when you were not able to pay the mortgage or rent on time? N   Transportation Needs   In the past 12 months, has lack of transportation kept you from medical appointments or from getting medications? no   In the past 12 months, has lack of transportation kept you from meetings, work, or from getting things needed for daily living? No     Juan Alberto Salcido is a 75 y.o. male on day 1 of admission presenting with Hematemesis with nausea.    Valerie June RN

## 2025-07-14 NOTE — PROCEDURES
Moderate Sedation    Date/Time: 7/14/2025 9:16 AM    Performed by: Red Nina MD  Authorized by: Red Nina MD    Consent:     Consent obtained:  Verbal    Consent given by:  Patient    Risks, benefits, and alternatives were discussed: yes      Risks discussed:  Allergic reaction, prolonged hypoxia resulting in organ damage, respiratory compromise necessitating ventilatory assistance and intubation and inadequate sedation    Alternatives discussed:  Analgesia without sedation  Universal protocol:     Protocol observed: The universal protocol was observed before the procedure and is documented in the nursing flowsheets    Indications:     Sedation is required to allow for: EGD.    Procedure necessitating sedation performed by:  Different physician (Dr. Hernandez (GI))    Level of sedation:  Moderate  Immediate pre-procedure details:     Monitoring: The patient is on appropriate monitoring (Including: 3 or 5 lead EKG, Pulse Oximetry, Capnography, and Blood Pressure monitoring), oxygenation has been addressed, and critical airway and emergency equipment is immediately available before the initiation of sedation      Reassessment: Patient reassessed immediately prior to procedure      Reviewed: vital signs, relevant labs/tests and NPO status    Procedure details (see MAR for exact dosages):     Sedation start time:  7/14/2025 8:55 AM    Intra-procedure management:  Fluid bolus and supplemental oxygen    Sedation end time:  7/14/2025 9:00 AM  Post-procedure details:     Attendance: Constant attendance by certified staff until patient recovered      Procedure completion:  Tolerated well, no immediate complications

## 2025-07-14 NOTE — CONSULTS
"Reason For Consult  GI bleed    History Of Present Illness  Juan Alberto Salcido is a 75 y.o. male with h/o HTN, HLD, DM, presented with c/o hematemesis.  He noticed some blood in his emesis on the day of admission, he was hypotensive.  He denies smoking or alcohol use, denies NSAID use.  H&H on admission 11.2 and 36.4.  Patient was given 2 units of PRBC, he was started on norepinephrine.  GI consulted.  He denies dysphagia, odynophagia, acid reflux.  He never had EGD.  Colonoscopy was done in the past, no reports available to review.    Past Medical History  He has no past medical history on file.    Surgical History  He has a past surgical history that includes Other surgical history (03/02/2021).     Social History  He reports that he has never smoked. He has never been exposed to tobacco smoke. He has never used smokeless tobacco. He reports that he does not drink alcohol and does not use drugs.    Family History  Family History[1]     Allergies  Patient has no known allergies.    Review of Systems  10 systems reviewed and negative other than HPI     Physical Exam  Physical Exam  Constitutional:       Appearance: Normal appearance. He is not ill-appearing.   Cardiovascular:      Rate and Rhythm: Normal rate and regular rhythm.   Pulmonary:      Breath sounds: Normal breath sounds.   Abdominal:      General: Bowel sounds are normal. There is no distension.      Palpations: Abdomen is soft.      Tenderness: There is no abdominal tenderness.   Skin:     General: Skin is warm and dry.   Neurological:      Mental Status: He is alert and oriented to person, place, and time. Mental status is at baseline.   Psychiatric:         Mood and Affect: Mood normal.         Behavior: Behavior normal.            Last Recorded Vitals  Blood pressure 88/64, pulse 72, temperature 36.9 °C (98.4 °F), temperature source Temporal, resp. rate 20, height 1.854 m (6' 1\"), weight 84.7 kg (186 lb 11.2 oz), SpO2 96%.    Relevant " Results      Scheduled medications  Scheduled Medications[2]  Continuous medications  Continuous Medications[3]  PRN medications  PRN Medications[4]  Esophagogastroduodenoscopy (EGD)  Result Date: 7/14/2025  Table formatting from the original result was not included. Impression The cricopharynx, upper third of the esophagus, middle third of the esophagus, lower third of the esophagus and GE junction appeared normal. Moderate edematous, granular, hemorrhagic mucosa in the fundus of the stomach, body of the stomach and antrum, suggestive of gastritis; performed cold forceps biopsy to rule out H. pylori The 1st part of the duodenum and 2nd part of the duodenum appeared normal. Findings The cricopharynx, upper third of the esophagus, middle third of the esophagus, lower third of the esophagus and GE junction appeared normal. Z-line is 40 cm from the incisors. Moderate, generalized edematous, granular and hemorrhagic mucosa that did not appear atrophic in the fundus of the stomach, body of the stomach and antrum, suggestive of gastritis; performed cold forceps biopsy to rule out H. pylori The 1st part of the duodenum and 2nd part of the duodenum appeared normal. Recommendation Await pathology results Follow up with PCP We will follow up on gastric biopsies as an outpatient Pantoprazole 40 mg once a day until biopsies available We will sign off - low risk of rebleeding  Indication Hematemesis with nausea Post-Op Diagnosis Acute superficial gastritis with hemorrhage Staff Staff Role Mandeep Hernandez,  Proceduralist Medications fentaNYL PF (Sublimaze) injection 50 mcg/mL  - Omnicell Override Pull 25 mcg midazolam (Versed) injection 1 mg/mL  - Omnicell Override Pull 4 mg (Totals for administrations occurring from 0836 to 0914 on 07/14/25) Preprocedure A history and physical has been performed, and patient medication allergies have been reviewed. The patient's tolerance of previous anesthesia has been reviewed. The risks and  benefits of the procedure and the sedation options and risks were discussed with the patient. All questions were answered and informed consent obtained. Details of the Procedure The patient underwent moderate sedation, which was administered by the procedural nurse. The patient's blood pressure, ECG, ETCO2, heart rate, level of consciousness, oxygen and respirations were monitored throughout the procedure. The scope was introduced through the mouth and advanced to the second part of the duodenum. Retroflexion was performed in the cardia. Prior to the procedure, the patient's H. Pylori status was unknown. The patient's estimated blood loss was minimal. The procedure was not difficult. The patient tolerated the procedure well. There were no apparent adverse events. Events Procedure Events Event Event Time ENDO SCOPE IN TIME 7/14/2025  8:50 AM ENDO SCOPE OUT TIME 7/14/2025  8:55 AM Specimens ID Type Source Tests Collected by Time 1 :  Tissue ANTRUM BODY BIOPSY SURGICAL PATHOLOGY EXAM Mandeep Hernandez DO 7/14/2025 0854 Procedure Location [address] [id number] [phone] Referring Provider Ivelisse Nina MD Procedure Provider Mandeep Hernandez,      ECG 12 lead  Result Date: 7/14/2025  Normal sinus rhythm Normal ECG No previous ECGs available    XR chest 1 view  Result Date: 7/13/2025  Interpreted By:  Lola Gates, STUDY: XR CHEST 1 VIEW;  7/13/2025 10:57 pm   INDICATION: Signs/Symptoms:confirm CVC     COMPARISON: CT cardiac scoring 09/12/2024.   ACCESSION NUMBER(S): [id number]   ORDERING CLINICIAN: IVELISSE NINA   TECHNIQUE: Portable upright frontal view of the chest was obtained .   FINDINGS: Monitoring leads are overlying the patient.   There is a right IJ central line with the tip overlying the region of the SVC.   The cardiomediastinal silhouette is within normal limits.   No focal consolidation, pleural effusion or pneumothorax.   There is  elevation of the left hemidiaphragm.       1.  Right IJ central line with the tip overlying the region of the SVC. No radiographic evidence of pneumothorax.       MACRO: None.   Signed by: Lola Gates 7/13/2025 11:41 PM Dictation workstation:   [id number]    Results for orders placed or performed during the hospital encounter of 07/13/25 (from the past 24 hours)   CBC and Auto Differential   Result Value Ref Range    WBC 15.1 (H) 4.4 - 11.3 x10*3/uL    nRBC 0.1 (H) 0.0 - 0.0 /100 WBCs    RBC 4.14 (L) 4.50 - 5.90 x10*6/uL    Hemoglobin 11.2 (L) 13.5 - 17.5 g/dL    Hematocrit 36.4 (L) 41.0 - 52.0 %    MCV 88 80 - 100 fL    MCH 27.1 26.0 - 34.0 pg    MCHC 30.8 (L) 32.0 - 36.0 g/dL    RDW 13.2 11.5 - 14.5 %    Platelets 272 150 - 450 x10*3/uL    Neutrophils % 82.9 40.0 - 80.0 %    Immature Granulocytes %, Automated 1.3 (H) 0.0 - 0.9 %    Lymphocytes % 9.2 13.0 - 44.0 %    Monocytes % 6.2 2.0 - 10.0 %    Eosinophils % 0.2 0.0 - 6.0 %    Basophils % 0.2 0.0 - 2.0 %    Neutrophils Absolute 12.49 (H) 1.60 - 5.50 x10*3/uL    Immature Granulocytes Absolute, Automated 0.19 0.00 - 0.50 x10*3/uL    Lymphocytes Absolute 1.38 0.80 - 3.00 x10*3/uL    Monocytes Absolute 0.94 (H) 0.05 - 0.80 x10*3/uL    Eosinophils Absolute 0.03 0.00 - 0.40 x10*3/uL    Basophils Absolute 0.03 0.00 - 0.10 x10*3/uL   Comprehensive metabolic panel   Result Value Ref Range    Glucose 168 (H) 74 - 99 mg/dL    Sodium 135 (L) 136 - 145 mmol/L    Potassium 5.4 (H) 3.5 - 5.3 mmol/L    Chloride 101 98 - 107 mmol/L    Bicarbonate 17 (L) 21 - 32 mmol/L    Anion Gap 22 (H) 10 - 20 mmol/L    Urea Nitrogen 77 (H) 6 - 23 mg/dL    Creatinine 1.96 (H) 0.50 - 1.30 mg/dL    eGFR 35 (L) >60 mL/min/1.73m*2    Calcium 9.5 8.6 - 10.3 mg/dL    Albumin 4.0 3.4 - 5.0 g/dL    Alkaline Phosphatase 46 33 - 136 U/L    Total Protein 7.1 6.4 - 8.2 g/dL    AST 10 9 - 39 U/L    Bilirubin, Total 0.4 0.0 - 1.2 mg/dL    ALT 12 10 - 52 U/L   Magnesium   Result Value Ref Range     Magnesium 1.82 1.60 - 2.40 mg/dL   Protime-INR   Result Value Ref Range    Protime 12.9 (H) 9.8 - 12.4 seconds    INR 1.2 (H) 0.9 - 1.1   Type And Screen   Result Value Ref Range    ABO TYPE A     Rh TYPE NEG     ANTIBODY SCREEN NEG    Prepare RBC: 2 Units   Result Value Ref Range    PRODUCT CODE R4179D71     Unit Number [id number]     Unit ABO A     Unit RH NEG     XM INTEP COMP     Dispense Status TR     Blood Expiration Date 8/14/2025 11:59:00 PM EDT     PRODUCT BLOOD TYPE 0600     UNIT VOLUME 350     PRODUCT CODE J1240Q38     Unit Number [id number]     Unit ABO A     Unit RH NEG     XM INTEP COMP     Dispense Status TR     Blood Expiration Date 8/14/2025 11:59:00 PM EDT     PRODUCT BLOOD TYPE 0600     UNIT VOLUME 350    ECG 12 lead   Result Value Ref Range    Ventricular Rate 98 BPM    Atrial Rate 98 BPM    WA Interval 140 ms    QRS Duration 72 ms    QT Interval 358 ms    QTC Calculation(Bazett) 457 ms    P Axis 64 degrees    R Axis 53 degrees    T Axis 60 degrees    QRS Count 16 beats    Q Onset 225 ms    P Onset 155 ms    P Offset 199 ms    T Offset 404 ms    QTC Fredericia 421 ms   VERIFY ABO/Rh Group Test   Result Value Ref Range    ABO TYPE A     Rh TYPE NEG    Lactate   Result Value Ref Range    Lactate 6.9 (HH) 0.4 - 2.0 mmol/L   POCT GLUCOSE   Result Value Ref Range    POCT Glucose 88 74 - 99 mg/dL   Prepare RBC: 2 Units   Result Value Ref Range    PRODUCT CODE N3214E29     Unit Number [id number]     Unit ABO A     Unit RH NEG     XM INTEP COMP     Dispense Status TR     Blood Expiration Date 8/4/2025 11:59:00 PM EDT     PRODUCT BLOOD TYPE 0600     UNIT VOLUME 292     PRODUCT CODE V6274V30     Unit Number [id number]     Unit ABO A     Unit RH NEG     XM INTEP COMP     Dispense Status TR     Blood Expiration Date 8/7/2025 11:59:00 PM EDT     PRODUCT BLOOD TYPE 0600     UNIT VOLUME 350    Prepare Plasma: 4 Units   Result Value Ref Range    PRODUCT CODE Q0424W32     Unit Number  [id number]     Unit ABO A     Unit RH POS     Dispense Status TR     Blood Expiration Date 7/18/2025  9:55:00 PM EDT     PRODUCT BLOOD TYPE 6200     UNIT VOLUME 204     PRODUCT CODE Z2895F22     Unit Number [id number]     Unit ABO A     Unit RH POS     Dispense Status TR     Blood Expiration Date 7/18/2025 10:00:00 PM EDT     PRODUCT BLOOD TYPE 6200     UNIT VOLUME 328     PRODUCT CODE G8711R09     Unit Number [id number]     Unit ABO A     Unit RH POS     Dispense Status TR     Blood Expiration Date 7/19/2025  1:27:00 AM EDT     PRODUCT BLOOD TYPE 6200     UNIT VOLUME 222     PRODUCT CODE E6100L82     Unit Number [id number]     Unit ABO A     Unit RH POS     Dispense Status TR     Blood Expiration Date 7/19/2025  1:29:00 AM EDT     PRODUCT BLOOD TYPE 6200     UNIT VOLUME 227    POCT GLUCOSE   Result Value Ref Range    POCT Glucose 166 (H) 74 - 99 mg/dL   Blood Gas Venous Full Panel   Result Value Ref Range    POCT pH, Venous 7.39 7.33 - 7.43 pH    POCT pCO2, Venous 42 41 - 51 mm Hg    POCT pO2, Venous 39 35 - 45 mm Hg    POCT SO2, Venous 74 45 - 75 %    POCT Oxy Hemoglobin, Venous 71.9 45.0 - 75.0 %    POCT Hematocrit Calculated, Venous 36.0 (L) 41.0 - 52.0 %    POCT Sodium, Venous 134 (L) 136 - 145 mmol/L    POCT Potassium, Venous 4.2 3.5 - 5.3 mmol/L    POCT Chloride, Venous 101 98 - 107 mmol/L    POCT Ionized Calicum, Venous 1.15 1.10 - 1.33 mmol/L    POCT Glucose, Venous 124 (H) 74 - 99 mg/dL    POCT Lactate, Venous 1.7 0.4 - 2.0 mmol/L    POCT Base Excess, Venous 0.3 -2.0 - 3.0 mmol/L    POCT HCO3 Calculated, Venous 25.4 22.0 - 26.0 mmol/L    POCT Hemoglobin, Venous 12.1 (L) 13.5 - 17.5 g/dL    POCT Anion Gap, Venous 12.0 10.0 - 25.0 mmol/L    Patient Temperature 37.0 degrees Celsius    FiO2 21 %   POCT GLUCOSE   Result Value Ref Range    POCT Glucose 128 (H) 74 - 99 mg/dL   CBC and Auto Differential   Result Value Ref Range    WBC 8.1 4.4 - 11.3 x10*3/uL    nRBC 0.0 0.0 - 0.0 /100 WBCs     RBC 4.15 (L) 4.50 - 5.90 x10*6/uL    Hemoglobin 11.6 (L) 13.5 - 17.5 g/dL    Hematocrit 35.5 (L) 41.0 - 52.0 %    MCV 86 80 - 100 fL    MCH 28.0 26.0 - 34.0 pg    MCHC 32.7 32.0 - 36.0 g/dL    RDW 15.0 (H) 11.5 - 14.5 %    Platelets 145 (L) 150 - 450 x10*3/uL    Neutrophils % 67.9 40.0 - 80.0 %    Immature Granulocytes %, Automated 0.9 0.0 - 0.9 %    Lymphocytes % 17.9 13.0 - 44.0 %    Monocytes % 12.6 2.0 - 10.0 %    Eosinophils % 0.5 0.0 - 6.0 %    Basophils % 0.2 0.0 - 2.0 %    Neutrophils Absolute 5.49 1.60 - 5.50 x10*3/uL    Immature Granulocytes Absolute, Automated 0.07 0.00 - 0.50 x10*3/uL    Lymphocytes Absolute 1.45 0.80 - 3.00 x10*3/uL    Monocytes Absolute 1.02 (H) 0.05 - 0.80 x10*3/uL    Eosinophils Absolute 0.04 0.00 - 0.40 x10*3/uL    Basophils Absolute 0.02 0.00 - 0.10 x10*3/uL   Comprehensive metabolic panel   Result Value Ref Range    Glucose 93 74 - 99 mg/dL    Sodium 138 136 - 145 mmol/L    Potassium 4.1 3.5 - 5.3 mmol/L    Chloride 104 98 - 107 mmol/L    Bicarbonate 26 21 - 32 mmol/L    Anion Gap 12 10 - 20 mmol/L    Urea Nitrogen 64 (H) 6 - 23 mg/dL    Creatinine 1.29 0.50 - 1.30 mg/dL    eGFR 58 (L) >60 mL/min/1.73m*2    Calcium 9.0 8.6 - 10.3 mg/dL    Albumin 3.8 3.4 - 5.0 g/dL    Alkaline Phosphatase 44 33 - 136 U/L    Total Protein 6.4 6.4 - 8.2 g/dL    AST 16 9 - 39 U/L    Bilirubin, Total 1.0 0.0 - 1.2 mg/dL    ALT 19 10 - 52 U/L   POCT GLUCOSE   Result Value Ref Range    POCT Glucose 158 (H) 74 - 99 mg/dL   Urticaria Transfusion Reaction Evaluation   Result Value Ref Range    Unit Number [id number]     COMPONENT CODE FFT    POCT GLUCOSE   Result Value Ref Range    POCT Glucose 158 (H) 74 - 99 mg/dL   CBC   Result Value Ref Range    WBC 7.3 4.4 - 11.3 x10*3/uL    nRBC 0.0 0.0 - 0.0 /100 WBCs    RBC 4.23 (L) 4.50 - 5.90 x10*6/uL    Hemoglobin 11.7 (L) 13.5 - 17.5 g/dL    Hematocrit 35.0 (L) 41.0 - 52.0 %    MCV 83 80 - 100 fL    MCH 27.7 26.0 - 34.0 pg    MCHC 33.4 32.0 - 36.0 g/dL     RDW 15.2 (H) 11.5 - 14.5 %    Platelets 146 (L) 150 - 450 x10*3/uL   Lactate   Result Value Ref Range    Lactate 1.2 0.4 - 2.0 mmol/L          Assessment/Plan     75-year-old male with history of hypertension, hyperlipidemia, presented with acute onset of hematemesis, acute anemia, hypertension.  Suspect upper GI bleed, possible etiologies include gastritis, PUD, esophagitis.    -Continue twice daily PPI  - EGD today  -Continue to monitor H&H and transfuse as needed to keep hemoglobin above 7.    Addendum:  EGD done, showed hemorrhagic gastritis, biopsied.  Okay to start clear liquid diet advance as tolerated.  GI will call with results of the biopsy.  Will sign off for now, please call if any questions or further assistance needed.      Janel De Leon, KARLY-CNP         [1] No family history on file.  [2] insulin lispro, 0-10 Units, subcutaneous, q4h  pantoprazole, 40 mg, intravenous, BID  pravastatin, 20 mg, oral, Nightly  [3] norepinephrine, 0.01-1 mcg/kg/min, Last Rate: 0.02 mcg/kg/min (07/14/25 1045)  vasopressin, 0-0.03 Units/min, Last Rate: Stopped (07/14/25 0230)  [4] PRN medications: alteplase, dextrose, diphenhydrAMINE, glucagon, ondansetron ODT **OR** ondansetron, oxygen

## 2025-07-14 NOTE — ASSESSMENT & PLAN NOTE
Acute gastrointestinal bleed  Hemorrhagic shock d/t GI bleed - Hemoglobin currently 11.2 with a baseline of 14. Pt hypotensive - levophed running at 0.08  Lactic acidosis  FLORENTINO -  Creatinine 1.96 (baseline of  (0.88-1.18)  slightly hyperkalemic at 5.4

## 2025-07-14 NOTE — PROCEDURES
CENTRAL LINE PLACEMENT      Indication(s):  -Inadequate IV access  -Administration of vasoactive or caustic agents  -CVP monitoring     Site:  - right internal jugular vein       Catheter: 8.5 Fr, 4 lumen, 20 cm radiopaque polyurethane     Sedation: 0  Patient positioned supine +/- slight Trendelenburg.  Sterility: Chlorhexidine skin prep. Hat and mask on myself and assistant(s). Antiseptic hand foam. Sterile gown, gloves and drape.  Local anesthesia: 5 mL of 1% lidocaine subcutaneously.  Ultrasound-guided insertion:  -YES (with sterile ultrasound probe cover)       Seldinger technique with 18 Ga x 2.5 in introducer needle. Guidewire thread easily through introducer needle.  -Needle position confirmed to be intravenous by fall of blood to gravity within pressure transduction tubing.  -Guidewire position confirmed to be intravenous by visualization on ultrasound in short and long axis views.  Small skin incision adjacent to guidewire with #11 scalpel. 10 Fr tissue dilator over guidewire. Catheter thread easily over guidewire and guidewire removed. All ports aspirated for complete removal of air, then flushed with sterile NS.  Catheter secured with sutures @ [] cm at skin.  -Biopatch and transparent film dressing.  -Transparent film dressing with CHG.  Sterility maintained throughout procedure. No complications. Patient tolerated well.     Chest x-ray  -pending.     Other details: None.     ARROW Quad-Lumen Central Venous Catheterization Kit  Lot # [id number]  Exp: 2026-03-28

## 2025-07-14 NOTE — NURSING NOTE
EGD performed at bedside. Prior to scope insertion, patient received 2mg Versed, 25mcg Fentanyl, and LR bolus per Dr. Nina. An additional 2mg of versed was given during the procedure per Dr. Nina. Patient remained hemodynamically stabled throughout EGD. Plan of care continues.

## 2025-07-14 NOTE — PROGRESS NOTES
"Juan Alberto Salcido is a 75 y.o. male on day 1 of admission presenting with Hematemesis with nausea.    Subjective   New admit for acute GIB received 4 units PRBCs, 4 units FFP with transfusion reaction following FFP.  Remains on Norepinephrine infusion @ 0.05mcg/kg/min  Upper endoscopy scheduled for today       Objective     Physical Exam  Constitutional:       General: He is not in acute distress.  Cardiovascular:      Rate and Rhythm: Normal rate and regular rhythm.      Pulses: Normal pulses.   Pulmonary:      Effort: Pulmonary effort is normal.      Comments: Diminished breath sounds bilaterally, good symmetrical chest expansion  Abdominal:      General: There is no distension.      Palpations: Abdomen is soft.      Tenderness: There is no abdominal tenderness.   Musculoskeletal:         General: Normal range of motion.   Skin:     General: Skin is warm and dry.      Capillary Refill: Capillary refill takes less than 2 seconds.   Neurological:      General: No focal deficit present.      Mental Status: He is alert and oriented to person, place, and time. Mental status is at baseline.   Psychiatric:         Mood and Affect: Mood normal.         Last Recorded Vitals  Blood pressure 88/64, pulse 80, temperature 36.9 °C (98.4 °F), temperature source Temporal, resp. rate 19, height 1.854 m (6' 1\"), weight 84.7 kg (186 lb 11.2 oz), SpO2 95%.  Intake/Output last 3 Shifts:  I/O last 3 completed shifts:  In: 2891.9 (34.1 mL/kg) [I.V.:149.9 (1.8 mL/kg); Blood:2742]  Out: 1700 (20.1 mL/kg) [Urine:1700 (0.6 mL/kg/hr)]  Weight: 84.7 kg     Relevant Results  Scheduled medications  Scheduled Medications[1]  Continuous medications  Continuous Medications[2]  PRN medications  PRN Medications[3]    Results for orders placed or performed during the hospital encounter of 07/13/25 (from the past 24 hours)   CBC and Auto Differential   Result Value Ref Range    WBC 15.1 (H) 4.4 - 11.3 x10*3/uL    nRBC 0.1 (H) 0.0 - 0.0 /100 WBCs    RBC " 4.14 (L) 4.50 - 5.90 x10*6/uL    Hemoglobin 11.2 (L) 13.5 - 17.5 g/dL    Hematocrit 36.4 (L) 41.0 - 52.0 %    MCV 88 80 - 100 fL    MCH 27.1 26.0 - 34.0 pg    MCHC 30.8 (L) 32.0 - 36.0 g/dL    RDW 13.2 11.5 - 14.5 %    Platelets 272 150 - 450 x10*3/uL    Neutrophils % 82.9 40.0 - 80.0 %    Immature Granulocytes %, Automated 1.3 (H) 0.0 - 0.9 %    Lymphocytes % 9.2 13.0 - 44.0 %    Monocytes % 6.2 2.0 - 10.0 %    Eosinophils % 0.2 0.0 - 6.0 %    Basophils % 0.2 0.0 - 2.0 %    Neutrophils Absolute 12.49 (H) 1.60 - 5.50 x10*3/uL    Immature Granulocytes Absolute, Automated 0.19 0.00 - 0.50 x10*3/uL    Lymphocytes Absolute 1.38 0.80 - 3.00 x10*3/uL    Monocytes Absolute 0.94 (H) 0.05 - 0.80 x10*3/uL    Eosinophils Absolute 0.03 0.00 - 0.40 x10*3/uL    Basophils Absolute 0.03 0.00 - 0.10 x10*3/uL   Comprehensive metabolic panel   Result Value Ref Range    Glucose 168 (H) 74 - 99 mg/dL    Sodium 135 (L) 136 - 145 mmol/L    Potassium 5.4 (H) 3.5 - 5.3 mmol/L    Chloride 101 98 - 107 mmol/L    Bicarbonate 17 (L) 21 - 32 mmol/L    Anion Gap 22 (H) 10 - 20 mmol/L    Urea Nitrogen 77 (H) 6 - 23 mg/dL    Creatinine 1.96 (H) 0.50 - 1.30 mg/dL    eGFR 35 (L) >60 mL/min/1.73m*2    Calcium 9.5 8.6 - 10.3 mg/dL    Albumin 4.0 3.4 - 5.0 g/dL    Alkaline Phosphatase 46 33 - 136 U/L    Total Protein 7.1 6.4 - 8.2 g/dL    AST 10 9 - 39 U/L    Bilirubin, Total 0.4 0.0 - 1.2 mg/dL    ALT 12 10 - 52 U/L   Magnesium   Result Value Ref Range    Magnesium 1.82 1.60 - 2.40 mg/dL   Protime-INR   Result Value Ref Range    Protime 12.9 (H) 9.8 - 12.4 seconds    INR 1.2 (H) 0.9 - 1.1   Type And Screen   Result Value Ref Range    ABO TYPE A     Rh TYPE NEG     ANTIBODY SCREEN NEG    Prepare RBC: 2 Units   Result Value Ref Range    PRODUCT CODE Q0675E76     Unit Number [id number]     Unit ABO A     Unit RH NEG     XM INTEP COMP     Dispense Status TR     Blood Expiration Date 8/14/2025 11:59:00 PM EDT     PRODUCT BLOOD TYPE 0600     UNIT VOLUME  350     PRODUCT CODE F7877W91     Unit Number [id number]     Unit ABO A     Unit RH NEG     XM INTEP COMP     Dispense Status TR     Blood Expiration Date 8/14/2025 11:59:00 PM EDT     PRODUCT BLOOD TYPE 0600     UNIT VOLUME 350    VERIFY ABO/Rh Group Test   Result Value Ref Range    ABO TYPE A     Rh TYPE NEG    Lactate   Result Value Ref Range    Lactate 6.9 (HH) 0.4 - 2.0 mmol/L   POCT GLUCOSE   Result Value Ref Range    POCT Glucose 88 74 - 99 mg/dL   Prepare RBC: 2 Units   Result Value Ref Range    PRODUCT CODE L9859D81     Unit Number [id number]     Unit ABO A     Unit RH NEG     XM INTEP COMP     Dispense Status TR     Blood Expiration Date 8/4/2025 11:59:00 PM EDT     PRODUCT BLOOD TYPE 0600     UNIT VOLUME 292     PRODUCT CODE Q1689M53     Unit Number [id number]     Unit ABO A     Unit RH NEG     XM INTEP COMP     Dispense Status TR     Blood Expiration Date 8/7/2025 11:59:00 PM EDT     PRODUCT BLOOD TYPE 0600     UNIT VOLUME 350    Prepare Plasma: 4 Units   Result Value Ref Range    PRODUCT CODE R8496D70     Unit Number [id number]     Unit ABO A     Unit RH POS     Dispense Status TR     Blood Expiration Date 7/18/2025  9:55:00 PM EDT     PRODUCT BLOOD TYPE 6200     UNIT VOLUME 204     PRODUCT CODE D9324E08     Unit Number [id number]     Unit ABO A     Unit RH POS     Dispense Status TR     Blood Expiration Date 7/18/2025 10:00:00 PM EDT     PRODUCT BLOOD TYPE 6200     UNIT VOLUME 328     PRODUCT CODE J2493O40     Unit Number [id number]     Unit ABO A     Unit RH POS     Dispense Status TR     Blood Expiration Date 7/19/2025  1:27:00 AM EDT     PRODUCT BLOOD TYPE 6200     UNIT VOLUME 222     PRODUCT CODE Y9433P36     Unit Number [id number]     Unit ABO A     Unit RH POS     Dispense Status TR     Blood Expiration Date 7/19/2025  1:29:00 AM EDT     PRODUCT BLOOD TYPE 6200     UNIT VOLUME 227    POCT GLUCOSE   Result Value Ref Range    POCT Glucose 166 (H) 74 - 99 mg/dL    Blood Gas Venous Full Panel   Result Value Ref Range    POCT pH, Venous 7.39 7.33 - 7.43 pH    POCT pCO2, Venous 42 41 - 51 mm Hg    POCT pO2, Venous 39 35 - 45 mm Hg    POCT SO2, Venous 74 45 - 75 %    POCT Oxy Hemoglobin, Venous 71.9 45.0 - 75.0 %    POCT Hematocrit Calculated, Venous 36.0 (L) 41.0 - 52.0 %    POCT Sodium, Venous 134 (L) 136 - 145 mmol/L    POCT Potassium, Venous 4.2 3.5 - 5.3 mmol/L    POCT Chloride, Venous 101 98 - 107 mmol/L    POCT Ionized Calicum, Venous 1.15 1.10 - 1.33 mmol/L    POCT Glucose, Venous 124 (H) 74 - 99 mg/dL    POCT Lactate, Venous 1.7 0.4 - 2.0 mmol/L    POCT Base Excess, Venous 0.3 -2.0 - 3.0 mmol/L    POCT HCO3 Calculated, Venous 25.4 22.0 - 26.0 mmol/L    POCT Hemoglobin, Venous 12.1 (L) 13.5 - 17.5 g/dL    POCT Anion Gap, Venous 12.0 10.0 - 25.0 mmol/L    Patient Temperature 37.0 degrees Celsius    FiO2 21 %   POCT GLUCOSE   Result Value Ref Range    POCT Glucose 128 (H) 74 - 99 mg/dL   CBC and Auto Differential   Result Value Ref Range    WBC 8.1 4.4 - 11.3 x10*3/uL    nRBC 0.0 0.0 - 0.0 /100 WBCs    RBC 4.15 (L) 4.50 - 5.90 x10*6/uL    Hemoglobin 11.6 (L) 13.5 - 17.5 g/dL    Hematocrit 35.5 (L) 41.0 - 52.0 %    MCV 86 80 - 100 fL    MCH 28.0 26.0 - 34.0 pg    MCHC 32.7 32.0 - 36.0 g/dL    RDW 15.0 (H) 11.5 - 14.5 %    Platelets 145 (L) 150 - 450 x10*3/uL    Neutrophils % 67.9 40.0 - 80.0 %    Immature Granulocytes %, Automated 0.9 0.0 - 0.9 %    Lymphocytes % 17.9 13.0 - 44.0 %    Monocytes % 12.6 2.0 - 10.0 %    Eosinophils % 0.5 0.0 - 6.0 %    Basophils % 0.2 0.0 - 2.0 %    Neutrophils Absolute 5.49 1.60 - 5.50 x10*3/uL    Immature Granulocytes Absolute, Automated 0.07 0.00 - 0.50 x10*3/uL    Lymphocytes Absolute 1.45 0.80 - 3.00 x10*3/uL    Monocytes Absolute 1.02 (H) 0.05 - 0.80 x10*3/uL    Eosinophils Absolute 0.04 0.00 - 0.40 x10*3/uL    Basophils Absolute 0.02 0.00 - 0.10 x10*3/uL   Comprehensive metabolic panel   Result Value Ref Range    Glucose 93 74 - 99  mg/dL    Sodium 138 136 - 145 mmol/L    Potassium 4.1 3.5 - 5.3 mmol/L    Chloride 104 98 - 107 mmol/L    Bicarbonate 26 21 - 32 mmol/L    Anion Gap 12 10 - 20 mmol/L    Urea Nitrogen 64 (H) 6 - 23 mg/dL    Creatinine 1.29 0.50 - 1.30 mg/dL    eGFR 58 (L) >60 mL/min/1.73m*2    Calcium 9.0 8.6 - 10.3 mg/dL    Albumin 3.8 3.4 - 5.0 g/dL    Alkaline Phosphatase 44 33 - 136 U/L    Total Protein 6.4 6.4 - 8.2 g/dL    AST 16 9 - 39 U/L    Bilirubin, Total 1.0 0.0 - 1.2 mg/dL    ALT 19 10 - 52 U/L     XR chest 1 view   Final Result   1.  Right IJ central line with the tip overlying the region of the   SVC. No radiographic evidence of pneumothorax.                  MACRO:   None.        Signed by: Lola Gates 7/13/2025 11:41 PM   Dictation workstation:   [id number]          This patient has a central line   Reason for the central line remaining today? Parenteral medication                 Assessment & Plan  Hematemesis with nausea  Acute gastrointestinal bleed  Hemorrhagic shock d/t GI bleed - Hemoglobin currently 11.2 with a baseline of 14. Pt hypotensive - levophed running at 0.08  Lactic acidosis  FLORENTINO -  Creatinine 1.96 (baseline of  (0.88-1.18)  slightly hyperkalemic at 5.4    Juan Alberto Salcido is a 75 y.o. male presenting with coffee ground emesis and melena x 2 days. He reports last vomiting and having a bloody bowel movement this morning. Patient also presents with hypotension, he last took his lisinopril last night but his atenolol and hydrochlorothiazide this morning. He denies drinking alcohol and does not take NSAIDs frequently. He denies abdominal pain/ tenderness or distention. He takes aspirin 81mg daily which was last taken this morning. Hemoglobin currently 11.2 with a baseline of 14. Two units of blood have been transfused. Patient also presents with an FLORENTINO, slightly hyperkalemic at 5.4 and creatinine at 1.96 (baseline of .88-1.18). admitted into the ICU for hemorrhagic shock and further monitoring.      Neurological:  - Pain and neuro assessment per unit protocol  - Sleep/wake cycle hygiene     Pulmonary:  - Maintain SpO2 > 92%  - Pulmonary toileting --> IS, acapella, cough and deep breathe     CV:  Hypotension 2/2 acute hemorrhagic shock due to UGIB, also noted patient has taken home antihypertensives prior to arrival to hospital, Required levophed for BP support, weaned to off this am.  - Titrate vasopressor support to maintain MAP >65  - ABP and tele-monitoring  - Hold ASA and home antihypertensives.  Resume as HDs and renal function permit  - Resume home lovstatin     GI/FEN/Endo:  Acute upper GIB, DM  Post EGD with findings of gastritis with biopsy to RO H. pylori  - Clear liquid diet and advance as tolerated  - Trend lactate  - PPI 40mg BID  - Consult GI re: upper GIB  - PCOT q4, insulin sliding scale ordered w/ hypoglycemia protocol  - Resume home antihyperglycemics as appropriate     Renal/ :  FLORENTINO in setting of acute hemorrhagic shock, resolved.   - Strict I&O's  - Daily weights  - Daily RFP and replete electrolytes as needed  - Avoid nephrotoxic agents, NSAIDs and renal dose medications     Heme/ID  Acute blood loss anemia 2/2 acute upper GIB  - Daily CBC and as needed  - SCDs for DVTppx  - Holding pharmacologic ppx at this time    Dispo: Will keep in ICU for today c possible transfer out tomorrow    Discussed with Dr. Nina during am rounds.     Family updated on patient condition and ongoing plan of care during am rounds.  All questions answered.       I spent 60 minutes in the professional and overall care of this patient.      KARLY Andujar-CNP           [1] EPINEPHrine, , ,   insulin lispro, 0-10 Units, subcutaneous, q4h  pantoprazole, 40 mg, intravenous, BID  [2] norepinephrine, 0.01-1 mcg/kg/min, Last Rate: 0.05 mcg/kg/min (07/14/25 0520)  vasopressin, 0-0.03 Units/min, Last Rate: Stopped (07/14/25 0230)  [3] PRN medications: alteplase, dextrose, diphenhydrAMINE, EPINEPHrine,  glucagon, ondansetron ODT **OR** ondansetron, oxygen

## 2025-07-14 NOTE — CONSULTS
"Nutrition Education Note  Nutrition Assessment      Reason for Assessment: Provider consult order, Diet education    Dietary Orders (From admission, onward)       Start     Ordered    07/14/25 1731  Adult diet Regular, Caffeine Free, Consistent Carb; CCD 90 gm/meal  Diet effective now        Question Answer Comment   Diet type Regular    Diet type Caffeine Free    Diet type Consistent Carb    Carb diet selection: CCD 90 gm/meal        07/14/25 1731 07/13/25 1850  May Participate in Room Service  ( ROOM SERVICE MAY PARTICIPATE)  Once        Question:  .  Answer:  Yes    07/13/25 1849                    Anthropometrics:  Height: 185.4 cm (6' 0.99\")  Weight: 84.7 kg (186 lb 11.7 oz)  BMI (Calculated): 24.64    IBW/kg (Dietitian Calculated): 78.2 kg       Scheduled medications  Scheduled Medications[1]  Continuous medications  Continuous Medications[2]  PRN medications  PRN Medications[3]       Latest Reference Range & Units 07/14/25 04:31   GLUCOSE 74 - 99 mg/dL 93   SODIUM 136 - 145 mmol/L 138   POTASSIUM 3.5 - 5.3 mmol/L 4.1   CHLORIDE 98 - 107 mmol/L 104   Bicarbonate 21 - 32 mmol/L 26   Anion Gap 10 - 20 mmol/L 12   Blood Urea Nitrogen 6 - 23 mg/dL 64 (H)   Creatinine 0.50 - 1.30 mg/dL 1.29   EGFR >60 mL/min/1.73m*2 58 (L)   Calcium 8.6 - 10.3 mg/dL 9.0   Albumin 3.4 - 5.0 g/dL 3.8   Alkaline Phosphatase 33 - 136 U/L 44   ALT 10 - 52 U/L 19   AST 9 - 39 U/L 16   Bilirubin Total 0.0 - 1.2 mg/dL 1.0   (H): Data is abnormally high  (L): Data is abnormally low    Hemoglobin A1C: 7.4 % (4/21/25)       Education Documentation  Nutrition Related Education, taught by Blanka Deras RD at 7/14/2025  5:33 PM.  Learner: Family, Patient  Readiness: Acceptance  Method: Explanation, Handout, Teach-back  Response: Verbalizes Understanding  Comment: pt's wife asking why pt is receiving regular gingerale on his tray when he is diabetic. explained to wife there is not a diabetic diet ordered, wife requesting this be added " to diet order. Also discussed bland diet for gastritis.      - written diet education material provided on GERD nutrition therapy/bland diet (AND education material and [facility] diet education material, 2023)    - diet order modified to include diabetic diet restriction     Time Spent (min): 30 minutes  Last Date of Nutrition Visit: 07/14/25  Nutrition Follow-Up Needed?: Dietitian to reassess per policy  Follow up Comment: diet educ, CORINNE            [1] insulin lispro, 0-10 Units, subcutaneous, q4h  pantoprazole, 40 mg, intravenous, BID  pravastatin, 20 mg, oral, Nightly     [2] norepinephrine, 0.01-1 mcg/kg/min, Last Rate: 0.02 mcg/kg/min (07/14/25 1645)     [3] PRN medications: alteplase, dextrose, diphenhydrAMINE, glucagon, ondansetron ODT **OR** ondansetron, oxygen

## 2025-07-14 NOTE — PROCEDURES
ARTERIAL LINE PLACEMENT      Indication(s):  -Continuous BP monitoring in hemodynamically unstable patient.     Site: left radial  artery.     Catheter:  -20 Ga x 12 cm radiopaque PEBA       Sedation: 0  Sterility: Chlorhexidine skin prep. Hat and mask on myself and assistant(s). Antiseptic hand foam. Sterile gloves and drape.  Local anesthesia: 1 mL of 1% lidocaine subcutaneously.  Ultrasound-guided insertion:  -NO     Seldinger technique with  -20 Ga x 2 in introducer needle. Guidewire thread easily into artery. Catheter thread easily over guidewire. Guidewire removed.    Arterial puncture x1     Catheter secured with  -Steri-Strip.  -sutures.  Transparent film dressing  -with CHG.  Sterility maintained throughout procedure. No complications. Patient tolerated well.     Other details: None.     ARROW Arterial Catheterization Kit  Lot # [id number]  Exp: 2026-04-21

## 2025-07-15 LAB
ALBUMIN SERPL BCP-MCNC: 3.7 G/DL (ref 3.4–5)
ANION GAP SERPL CALC-SCNC: 10 MMOL/L (ref 10–20)
BUN SERPL-MCNC: 35 MG/DL (ref 6–23)
CALCIUM SERPL-MCNC: 9 MG/DL (ref 8.6–10.3)
CHLORIDE SERPL-SCNC: 102 MMOL/L (ref 98–107)
CO2 SERPL-SCNC: 29 MMOL/L (ref 21–32)
CREAT SERPL-MCNC: 1.2 MG/DL (ref 0.5–1.3)
DIAGNOSIS-BB-PR33: NORMAL
EGFRCR SERPLBLD CKD-EPI 2021: 63 ML/MIN/1.73M*2
ERYTHROCYTE [DISTWIDTH] IN BLOOD BY AUTOMATED COUNT: 15.2 % (ref 11.5–14.5)
EST. AVERAGE GLUCOSE BLD GHB EST-MCNC: 128 MG/DL
GLUCOSE BLD MANUAL STRIP-MCNC: 121 MG/DL (ref 74–99)
GLUCOSE BLD MANUAL STRIP-MCNC: 167 MG/DL (ref 74–99)
GLUCOSE BLD MANUAL STRIP-MCNC: 189 MG/DL (ref 74–99)
GLUCOSE BLD MANUAL STRIP-MCNC: 201 MG/DL (ref 74–99)
GLUCOSE SERPL-MCNC: 132 MG/DL (ref 74–99)
HBA1C MFR BLD: 6.1 % (ref ?–5.7)
HCT VFR BLD AUTO: 35.6 % (ref 41–52)
HGB BLD-MCNC: 11.7 G/DL (ref 13.5–17.5)
MAGNESIUM SERPL-MCNC: 1.85 MG/DL (ref 1.6–2.4)
MCH RBC QN AUTO: 28 PG (ref 26–34)
MCHC RBC AUTO-ENTMCNC: 32.9 G/DL (ref 32–36)
MCV RBC AUTO: 85 FL (ref 80–100)
NRBC BLD-RTO: 0 /100 WBCS (ref 0–0)
PATH REVIEW-TRANSFUSION REACTION: NORMAL
PHOSPHATE SERPL-MCNC: 2.7 MG/DL (ref 2.5–4.9)
PLATELET # BLD AUTO: 141 X10*3/UL (ref 150–450)
POTASSIUM SERPL-SCNC: 4 MMOL/L (ref 3.5–5.3)
RBC # BLD AUTO: 4.18 X10*6/UL (ref 4.5–5.9)
SODIUM SERPL-SCNC: 137 MMOL/L (ref 136–145)
STAPHYLOCOCCUS SPEC CULT: NORMAL
TYPE OF REACTION: NORMAL
WBC # BLD AUTO: 8.5 X10*3/UL (ref 4.4–11.3)

## 2025-07-15 PROCEDURE — 2500000002 HC RX 250 W HCPCS SELF ADMINISTERED DRUGS (ALT 637 FOR MEDICARE OP, ALT 636 FOR OP/ED): Performed by: NURSE PRACTITIONER

## 2025-07-15 PROCEDURE — 82947 ASSAY GLUCOSE BLOOD QUANT: CPT

## 2025-07-15 PROCEDURE — 99291 CRITICAL CARE FIRST HOUR: CPT | Performed by: NURSE PRACTITIONER

## 2025-07-15 PROCEDURE — 85027 COMPLETE CBC AUTOMATED: CPT | Performed by: NURSE PRACTITIONER

## 2025-07-15 PROCEDURE — 2500000004 HC RX 250 GENERAL PHARMACY W/ HCPCS (ALT 636 FOR OP/ED): Performed by: NURSE PRACTITIONER

## 2025-07-15 PROCEDURE — 97161 PT EVAL LOW COMPLEX 20 MIN: CPT | Mod: GP

## 2025-07-15 PROCEDURE — 2060000001 HC INTERMEDIATE ICU ROOM DAILY

## 2025-07-15 PROCEDURE — 80069 RENAL FUNCTION PANEL: CPT | Performed by: NURSE PRACTITIONER

## 2025-07-15 PROCEDURE — 2500000001 HC RX 250 WO HCPCS SELF ADMINISTERED DRUGS (ALT 637 FOR MEDICARE OP): Performed by: NURSE PRACTITIONER

## 2025-07-15 PROCEDURE — 83036 HEMOGLOBIN GLYCOSYLATED A1C: CPT | Mod: AHULAB | Performed by: INTERNAL MEDICINE

## 2025-07-15 PROCEDURE — 83735 ASSAY OF MAGNESIUM: CPT | Performed by: NURSE PRACTITIONER

## 2025-07-15 RX ORDER — PANTOPRAZOLE SODIUM 40 MG/10ML
40 INJECTION, POWDER, LYOPHILIZED, FOR SOLUTION INTRAVENOUS
Status: DISCONTINUED | OUTPATIENT
Start: 2025-07-16 | End: 2025-07-16 | Stop reason: SDUPTHER

## 2025-07-15 RX ORDER — LANOLIN ALCOHOL/MO/W.PET/CERES
400 CREAM (GRAM) TOPICAL DAILY
Status: DISCONTINUED | OUTPATIENT
Start: 2025-07-15 | End: 2025-07-16 | Stop reason: HOSPADM

## 2025-07-15 RX ADMIN — PRAVASTATIN SODIUM 20 MG: 20 TABLET ORAL at 21:29

## 2025-07-15 RX ADMIN — INSULIN LISPRO 4 UNITS: 100 INJECTION, SOLUTION INTRAVENOUS; SUBCUTANEOUS at 17:00

## 2025-07-15 RX ADMIN — PANTOPRAZOLE SODIUM 40 MG: 40 INJECTION, POWDER, FOR SOLUTION INTRAVENOUS at 08:13

## 2025-07-15 RX ADMIN — INSULIN LISPRO 2 UNITS: 100 INJECTION, SOLUTION INTRAVENOUS; SUBCUTANEOUS at 08:14

## 2025-07-15 RX ADMIN — INSULIN LISPRO 2 UNITS: 100 INJECTION, SOLUTION INTRAVENOUS; SUBCUTANEOUS at 11:27

## 2025-07-15 RX ADMIN — Medication 1 TABLET: at 06:30

## 2025-07-15 ASSESSMENT — PAIN SCALES - GENERAL
PAINLEVEL_OUTOF10: 0 - NO PAIN

## 2025-07-15 ASSESSMENT — COGNITIVE AND FUNCTIONAL STATUS - GENERAL
MOBILITY SCORE: 24
DAILY ACTIVITIY SCORE: 24
MOBILITY SCORE: 24

## 2025-07-15 ASSESSMENT — PAIN - FUNCTIONAL ASSESSMENT
PAIN_FUNCTIONAL_ASSESSMENT: 0-10

## 2025-07-15 ASSESSMENT — ACTIVITIES OF DAILY LIVING (ADL): ADL_ASSISTANCE: INDEPENDENT

## 2025-07-15 NOTE — PROGRESS NOTES
Occupational Therapy                 Occupational Therapy Screen & Discharge    Patient Name: Juan Alberto Salcido  MRN: [id number]  Department: [facility] [address] ICU  Room: [address] [place], [place] [address]  Today's Date: 7/15/2025     Discipline: Occupational Therapy    OT Missed Visit:  (No)     (Referral received, however spoke with physical therapy and patient is completely independent with ADLs and transfers. No skilled OT needs after discharge. Will discontinue OT order.)

## 2025-07-15 NOTE — CARE PLAN
Problem: Pain - Adult  Goal: Verbalizes/displays adequate comfort level or baseline comfort level  Outcome: Progressing     Problem: Discharge Planning  Goal: Discharge to home or other facility with appropriate resources  Outcome: Progressing   The patient's goals for the shift include      The clinical goals for the shift include patient will remain HDS    Over the shift, the patient did not make progress toward the following goals. Barriers to progression include   Problem: Nutrition  Goal: Nutrient intake appropriate for maintaining nutritional needs  Outcome: Progressing   . Recommendations to address these barriers include   Problem: Discharge Planning  Goal: Discharge to home or other facility with appropriate resources  Outcome: Progressing   .

## 2025-07-15 NOTE — PROGRESS NOTES
Physical Therapy    Physical Therapy Evaluation    Patient Name: Juan Alberto Salcido  MRN: [id number]  Today's Date: 7/15/2025   Time Calculation  Start Time: 1117  Stop Time: 1134  Time Calculation (min): 17 min  426/426-A    Assessment/Plan   PT Assessment  Barriers to Discharge Home: No anticipated barriers  Evaluation/Treatment Tolerance: Patient tolerated treatment well  Medical Staff Made Aware: Yes  End of Session Communication: Bedside nurse  Assessment Comment: PT eval completed, and pt is independent with all mobility. No PT needs identified. Pt and wife in agreement.  End of Session Patient Position: Alarm off, caregiver present (toilet (nurse and wife present))  IP OR SWING BED PT PLAN  Inpatient or Swing Bed: Inpatient  PT Plan  PT Plan: PT Eval only  PT Eval Only Reason: At baseline function  PT Frequency: PT eval only  PT Discharge Recommendations: No further acute PT  PT Recommended Transfer Status: Independent  PT - OK to Discharge: Yes    Subjective     Current Problem:  1. Hematemesis with nausea  Esophagogastroduodenoscopy (EGD)    Esophagogastroduodenoscopy (EGD)    Surgical Pathology Exam    Surgical Pathology Exam    Moderate Sedation    Moderate Sedation      2. Melena  Surgical Pathology Exam    Surgical Pathology Exam      3. Blood loss anemia  Surgical Pathology Exam    Surgical Pathology Exam      4. FLORENTINO (acute kidney injury)  Surgical Pathology Exam    Surgical Pathology Exam        Problem List[1]    General Visit Information:  General  Reason for Referral: Pt admitted from home with melena and vomiting blood, anemia, + GIB s/p 4 units PRBCs.  Family/Caregiver Present: Yes  Caregiver Feedback: spouse present and supportive  Prior to Session Communication: Bedside nurse  Patient Position Received: Bed, 3 rail up, Alarm on  Preferred Learning Style: auditory, kinesthetic, visual  General Comment: Pt is pleasant and cooperative, participates fully.    Home Living:  Home Living  Type of Home:  House  Lives With: Spouse  Home Adaptive Equipment: Walker rolling or standard, Cane  Home Layout: One level  Home Access: Stairs to enter with rails  Entrance Stairs-Rails: Both  Entrance Stairs-Number of Steps: 2    Prior Level of Function:  Prior Function Per Pt/Caregiver Report  Level of [place]: Independent with ADLs and functional transfers, Independent with homemaking with ambulation  ADL Assistance: Independent  Ambulatory Assistance: Independent    Precautions:  Precautions  Medical Precautions: Fall precautions     Objective     Pain:  Pain Assessment  Pain Assessment: 0-10  0-10 (Numeric) Pain Score: 0 - No pain    Cognition:  Cognition  Overall Cognitive Status: Within Functional Limits  Attention: Within Functional Limits  Safety/Judgement: Within Functional Limits  Insight: Within function limits  Impulsive: Within functional limits    General Assessments:      Activity Tolerance  Endurance: Endurance does not limit participation in activity  Sensation  Light Touch: No apparent deficits     Perception  Inattention/Neglect: Appears intact  Initiation: Appears intact  Motor Planning: Appears intact  Coordination  Movements are Fluid and Coordinated: Yes     Static Sitting Balance  Static Sitting-Balance Support: Feet supported, No upper extremity supported  Static Sitting-Level of Assistance: Independent  Dynamic Sitting Balance  Dynamic Sitting-Balance Support: Feet unsupported, Bilateral upper extremity supported  Dynamic Sitting-Level of Assistance: Independent  Dynamic Sitting-Balance: Trunk control activities  Static Standing Balance  Static Standing-Balance Support: No upper extremity supported  Static Standing-Level of Assistance: Independent  Dynamic Standing Balance  Dynamic Standing-Balance Support: No upper extremity supported  Dynamic Standing-Level of Assistance: Independent  Dynamic Standing-Balance: Forward lean, Turning    Functional Assessments:     Bed Mobility  Bed Mobility:  Yes  Bed Mobility 1  Bed Mobility 1: Supine to sitting  Level of Assistance 1: Independent  Bed Mobility Comments 1: HOB flat  Transfers  Transfer: Yes  Transfer 1  Technique 1: Sit to stand, Stand to sit  Transfer Level of Assistance 1: Independent  Ambulation/Gait Training  Ambulation/Gait Training Performed: Yes  Ambulation/Gait Training 1  Surface 1: Level tile  Device 1: No device  Assistance 1: Independent  Comments/Distance (ft) 1: >350'  Stairs  Stairs: No (PT and pt do not anticipate any difficulties with 2STE with BHR)       Extremity/Trunk Assessments:  RUE   RUE : Within Functional Limits  LUE   LUE: Within Functional Limits  RLE   RLE : Within Functional Limits  LLE   LLE : Within Functional Limits    Outcome Measures:     [facility] Basic Mobility  Turning from your back to your side while in a flat bed without using bedrails: None  Moving from lying on your back to sitting on the side of a flat bed without using bedrails: None  Moving to and from bed to chair (including a wheelchair): None  Standing up from a chair using your arms (e.g. wheelchair or bedside chair): None  To walk in hospital room: None  Climbing 3-5 steps with railing: None  Basic Mobility - Total Score: 24                    FSS-ICU  Ambulation: Walks >/ or equal to 150 feet independently  Rolling: Complete independence  Sitting: Complete independence  Transfer Sit-to-Stand: Complete independence  Transfer Supine-to-Sit: Complete independence  Total Score: 35        Education Documentation  Mobility Training, taught by Jeanna Olivera PT at 7/15/2025 11:47 AM.  Learner: Significant Other, Patient  Readiness: Acceptance  Method: Explanation  Response: Verbalizes Understanding, Demonstrated Understanding  Comment: see above    Education Comments  No comments found.              [1]   Patient Active Problem List  Diagnosis    Type 2 diabetes mellitus with hyperglycemia, without long-term current use of insulin    Essential (primary)  hypertension    Hyperlipemia, mixed    Male erectile disorder of organic origin    Sprain of medial collateral ligament of left knee    Hematemesis with nausea

## 2025-07-15 NOTE — PROGRESS NOTES
Nursing Assessment Note  cycle 18 Opdivo       Date:  2018    Name:  Mahnaz Marsh   :  Oct 11, 1930    Diagnosis: Primary C34.90 - Malignant neoplasm of unspecified part of unspecified bronchus or lung,  Diagnosed   (Active)    Treatment Regimen:     Nivo 480 q 28    Last Treatment Date:  Oct 04, 2018    Toxicities:  There are no reported toxicities.    Vital Signs:  Performed on 2018 09:24  Height - 156.50 cms   Weight - 54.2 kg (HIGH)   BSA - 1.53 sq.m   BMI - 22.1300   Temperature - 97.6 F   Pulse - 77 /min   Respiration - 16 /min   BP - 149/74 mm(hg) (HIGH)   Pain - 0      Allergies:  penicillin  Allergies were reviewed.  No new allergies.    Medications:   The medication list was reviewed. No changes noted.    Psychosocial assessment:    Choose one:    No new psychosocial concerns      ECOG performance status:   Choose:   0 fully functional        Labs reviewed with the patient:  yes   x   no   If no, explain:      Contraceptive Review:  NA: Patient is post menopausal        Note:   Pt here for cycle18      Opdivo  seen by MD labs and scan reviewed  no complaints noted constipation resolved and blood sugar well controlled    cycle 18 opdivo   P next appointment 1 month  Questions were answered and understanding was verbalized.  Electronically signed by,    Raisa Munoz        PROGRESS NOTE - INTERNAL MEDICINE     PATIENT NAME:  Juan Alberto Salcido    MRN:  [id number]  SERVICE DATE:  7/15/2025       ADMITTING PHYSICIAN:  Red Nina MD    ASSESSMENT AND PLAN    FLORENTINO better  Acute gastritis causing UGI bleed K29.01  Hypotension, probably from hypovolemic shock, POA, augmented by antihypertensive medications.  Improving  Acute blood loss anemia, s/p 2 units of blood transfusion  Hx hypertension  Hyperlipidemia  DM2 uncontr now, A1c 6.1 NOW, 7.4, 4/25  Mild thrombocytopenia, relatively stable currently.  Uncertain etiology.    PLAN:  PPI twice daily  ADAT  Monitor Hb, platelets, renal function.  Accuch ACHS SSI hypogly protocol. Diab diet  HOLD BP meds for now. Restart as needed.  DVT Proph with SCDs.    DISPOSITION PLAN:  ?DC home elen if stable.     INTERVAL HISTORY OF PRESENT ILLNESS:  Black stool earlier today. No red blood. No chest pain/sob. No n/v/d. Oral intake good - with solids. Feeling better. No fever/chills. acute events from last 24 hrs reviewed.    Pertinent ROS:  No abdominal pain / No Bleeding / No rashes    Discussed with nursing and case management team and the specialists involved in this patient's care. Reviewed the EMR and documentation from other care-givers.      OBJECTIVE  PHYSICAL EXAM:     GENERAL: awake, alert, Ox3, cooperative resting comfortably  SKIN: Skin turgor normal. No rashes  HEENT: no epistaxis, Moist mucosa.  LUNGS: Bilat breath sounds, with no added sounds  CARDIAC: REGULAR. no rubs, no murmur  ABDOMEN: soft, non-tender. +BS.  EXTREMITIES: No edema, Good capillary refill.   NEURO: Insight GOOD. No invol movements. Gait not assessed  MUSCULOSKELETAL: No acute inflammation             7/15/2025     4:00 AM 7/15/2025     5:00 AM 7/15/2025     6:00 AM 7/15/2025     7:00 AM 7/15/2025     7:30 AM 7/15/2025     8:00 AM 7/15/2025     9:00 AM   Vitals   Systolic 98 100 98 93 93 93 95   Diastolic 70 67 62 51 65 72 72   BP Location Right arm    Right arm     Heart  Rate 68 70 65 73 63 70 90   Temp 36.4 °C (97.5 °F)    36 °C (96.8 °F)     Resp 16 20 14 14 13 18 18   Weight (lb)   187.1       BMI   24.69 kg/m2       BSA (m2)   2.09 m2         Body mass index is 24.69 kg/m².    Intake/Output Summary (Last 24 hours) at 7/15/2025 0953  Last data filed at 7/15/2025 0400  Gross per 24 hour   Intake 546.32 ml   Output 2875 ml   Net -2328.68 ml         Current Medications[1]    DATA:   Diagnostic tests reviewed for today's visit:    Most recent labs  Results from last 7 days   Lab Units 07/15/25  0349 07/14/25  1116 07/14/25  0431   WBC AUTO x10*3/uL 8.5 7.3 8.1   HEMOGLOBIN g/dL 11.7* 11.7* 11.6*   HEMATOCRIT % 35.6* 35.0* 35.5*   PLATELETS AUTO x10*3/uL 141* 146* 145*     Results from last 7 days   Lab Units 07/15/25  0349 07/14/25  0431 07/13/25  1556   SODIUM mmol/L 137 138 135*   POTASSIUM mmol/L 4.0 4.1 5.4*   CHLORIDE mmol/L 102 104 101   CO2 mmol/L 29 26 17*   BUN mg/dL 35* 64* 77*   CREATININE mg/dL 1.20 1.29 1.96*   CALCIUM mg/dL 9.0 9.0 9.5   PROTEIN TOTAL g/dL  --  6.4 7.1   BILIRUBIN TOTAL mg/dL  --  1.0 0.4   ALK PHOS U/L  --  44 46   ALT U/L  --  19 12   AST U/L  --  16 10   GLUCOSE mg/dL 132* 93 168*             Results from last 7 days   Lab Units 07/15/25  0804 07/14/25  1921 07/14/25  1842 07/14/25  1528 07/14/25  1109 07/14/25  0723 07/14/25  0311 07/13/25  2348 07/13/25 2029   POCT GLUCOSE mg/dL 167* 215* 252* 177* 158* 158* 128* 166* 88        XR chest 1 view   Final Result   1.  Right IJ central line with the tip overlying the region of the   SVC. No radiographic evidence of pneumothorax.                  MACRO:   None.        Signed by: Lola Gates 7/13/2025 11:41 PM   Dictation workstation:   [id number]            SIGNATURE: Kalie Nascimento MD PATIENT NAME: Juan Alberto Salcido   DATE: 7/15/2025 MRN: [id number]   TIME: 9:53 AM             [1]   Current Facility-Administered Medications:     alteplase (Cathflo Activase) injection 2 mg, 2 mg, intra-catheter,  PRN, Marcyl M Liscoe, APRN-CNP    dextrose 50 % injection 12.5 g, 12.5 g, intravenous, q15 min PRN, Brevivyl M Liscoe, APRN-CNP    diphenhydrAMINE (BENADryl) injection 25 mg, 25 mg, intravenous, q4h PRN, Bretzyl M Liscoe, APRN-CNP    glucagon (Glucagen) injection 1 mg, 1 mg, intramuscular, q15 min PRN, Brevivyl M Liscoe, APRN-CNP    insulin lispro injection 0-10 Units, 0-10 Units, subcutaneous, Before meals & nightly, Brevivyl M Liscoe, APRN-CNP, 2 Units at 07/15/25 0814    magnesium oxide (Mag-Ox) 400 mg (241.3 mg elemental) tablet 1 tablet, 400 mg of magnesium oxide, oral, Daily, Sujit M Liscoe, APRN-CNP, 1 tablet at 07/15/25 0630    ondansetron ODT (Zofran-ODT) disintegrating tablet 4 mg, 4 mg, oral, q8h PRN **OR** ondansetron (Zofran) injection 4 mg, 4 mg, intravenous, q8h PRN, Marcyl M Liscoe, APRN-CNP    oxygen (O2) therapy, , inhalation, Continuous PRN - O2/gases, Marcyl M Liscoe, APRN-CNP    pantoprazole (Protonix) injection 40 mg, 40 mg, intravenous, BID, Sujit OLEA Liscoe, APRN-CNP, 40 mg at 07/15/25 0813    pravastatin (Pravachol) tablet 20 mg, 20 mg, oral, Nightly, Earnest Allison, APRN-CNP, 20 mg at 07/14/25 2035

## 2025-07-15 NOTE — PROGRESS NOTES
" Intensive Care Unit- Daily Progress Note   Subjective    Juan Alberto Salcido is a 75 y.o. year old male patient admitted on 7/13/2025 with following ICU needs: Acute esophagitis     Interval History:  -Hemodynamically stable overnight   -Remains off of vasopressors     Meds    Scheduled medications  Scheduled Medications[1]  Continuous medications  Continuous Medications[2]  PRN medications  PRN Medications[3]     Objective    Blood pressure 93/51, pulse 73, temperature 36.4 °C (97.5 °F), temperature source Temporal, resp. rate 14, height 1.854 m (6' 0.99\"), weight 84.9 kg (187 lb 1.6 oz), SpO2 94%. Body mass index is 24.69 kg/m².    Physical Exam   GENERAL:    HEAD/SINUSES: no sinus tenderness  OROPHARYNX: Moist mucosa, no thrush or lesions  NECK: no JVD, midline trachea without stridor.    LUNGS: Symmetric chest. Good excursion. Equal breath sounds. no wheezing. no crackles or rhonchi  CARDIAC: normal S1 and S2; no gallops, rubs or murmurs. Regular rate and rhythm  ABDOMEN: soft, not tender, bowel sounds present  EXTREMITIES: No edema,    NEURO: grossly normal mental status  SKIN: Skin turgor normal. No rashes or lesions.   PSYCH: Normal affect    Ventilator Settings       Fluid balance    Intake/Output Summary (Last 24 hours) at 7/15/2025 0714  Last data filed at 7/15/2025 0400  Gross per 24 hour   Intake 546.32 ml   Output 3325 ml   Net -2778.68 ml       Labs:   Results from last 72 hours   Lab Units 07/15/25  0349 07/14/25  0431 07/13/25  1556   SODIUM mmol/L 137 138 135*   POTASSIUM mmol/L 4.0 4.1 5.4*   CHLORIDE mmol/L 102 104 101   CO2 mmol/L 29 26 17*   BUN mg/dL 35* 64* 77*   CREATININE mg/dL 1.20 1.29 1.96*   GLUCOSE mg/dL 132* 93 168*   CALCIUM mg/dL 9.0 9.0 9.5   ANION GAP mmol/L 10 12 22*   EGFR mL/min/1.73m*2 63 58* 35*   PHOSPHORUS mg/dL 2.7  --   --       Results from last 72 hours   Lab Units 07/15/25  0349 07/14/25  1116 07/14/25  0431 07/13/25  1556   WBC AUTO x10*3/uL 8.5 7.3 8.1 15.1*   HEMOGLOBIN " "g/dL 11.7* 11.7* 11.6* 11.2*   HEMATOCRIT % 35.6* 35.0* 35.5* 36.4*   PLATELETS AUTO x10*3/uL 141* 146* 145* 272   NEUTROS PCT AUTO %  --   --  67.9 82.9   LYMPHS PCT AUTO %  --   --  17.9 9.2   MONOS PCT AUTO %  --   --  12.6 6.2   EOS PCT AUTO %  --   --  0.5 0.2          Results from last 72 hours   Lab Units 07/14/25  0300   POCT PH, VENOUS pH 7.39   POCT PCO2, VENOUS mm Hg 42   POCT PO2, VENOUS mm Hg 39      Results from last 72 hours   Lab Units 07/14/25  1153 07/13/25  1857   LACTATE mmol/L 1.2 6.9*     Micro/ID:   No results found for: \"URINECULTURE\", \"BLOODCULT\", \"CSFCULTSMEAR\"      Impression   75 y.o. male presenting with coffee ground emesis and melena x 2 days. He reports last vomiting and having a bloody bowel movement this morning. Patient also presents with hypotension, he last took his lisinopril last night but his atenolol and hydrochlorothiazide this morning. He denies drinking alcohol and does not take NSAIDs frequently. He denies abdominal pain/ tenderness or distention. He takes aspirin 81mg daily which was last taken this morning. Hemoglobin currently 11.2 with a baseline of 14. Two units of blood have been transfused. Patient also presents with an FLORENTINO, slightly hyperkalemic at 5.4 and creatinine at 1.96 (baseline of .88-1.18). admitted into the ICU for hemorrhagic shock and further monitoring.     Management Plans   As follows:  Plan:  NEUROLOGY/PSYCH:  Dx:  No acute concerns  Management:  Serial neuro and pain assessments  Sleep hygiene    CARDIOVASCULAR:  Dx:  Hypertension with hypotension this admission 2/2 hemorrhagic shock (Resolved)  HLD  Management:  Continuous telemetry   Monitor blood pressure   Continue pravastatin   Hold hydrochlorothiazide/lisinopril/atenolol     PULMONARY:  Dx:  No acute concerns   Management:  Maintain SpO2 >92%  Monitor pulse ox     RENAL/GENITOURINARY:  Dx:  FLORENTINO 2/2 hemorrhagic shock   Management:  Trend RFP  Replete electrolytes as indicated  Strict " I&Os    GASTROENTEROLOGY:  Dx:  GIB (Acute superficial gastritis with hemorrhage)  Management:  GI following  Regular diet  PPI BID     ENDOCRINOLOGY:  Dx:  NIDDM2  Management:  SSI   Hypoglycemia protocol   Hold home metformin/sitagliptin/dapagliflozin for now     HEMATOLOGY:  Dx:  Acute anemia in setting of GIB   Management:  Trend CBC   Transfuse for Hgb <7    INFECTIOUS DISEASE:  Dx:  No acute concerns  Management:  Trend WBC   Trend temperature     Plan discussed and formulated with Dr. Nina    Hardware:         CVC 07/13/25 Triple lumen Non-tunneled Right Internal jugular (Active)   Placement Date/Time: 07/13/25 2233   Hand Hygiene Performed Prior to CVC Insertion: Yes  Site Prep: Usual sterile procedure followed  Site Prep Agent has Completely Dried Before Insertion: Yes  All 5 Sterile Barriers Used (Gloves, Gown, Cap, Mask, Lar...   Number of days: 1       Arterial Line 07/13/25 Left Radial (Active)   Placement Date/Time: 07/13/25 2020   Hand Hygiene Completed: Yes  Size: 20 G  Orientation: Left  Location: Radial  Site Prep: Usual sterile procedure followed  Technique: Ultrasound guidance  Placed by: Sujit Sanabria NP  Insertion attempts: 1  Secur...   Number of days: 1       Social:  Code: Full Code    Disposition: Transfer to [facility]    Hemalatha Singh, APRN-CNP, DNP   07/15/25 at 7:14 AM     Disclaimer: Documentation completed with the information available at the time of input. The times in the chart may not be reflective of actual patient care times, interventions, or procedures. Documentation occurs after the physical care of the patient.   ---------------------------------------  this critically ill patient continues to be at-risk for deterioration / failure due to the above  mentioned dysfunctional unstable organ systems.  I have reviewed, evaluated, identified and managed all critical medical problems.  Assessment, impressions and plans are reflected in the note above as well as the orders.  Critical  care time is spent at bedside includes review of diagnostic tests, labs, and radiographs, serial assessments and management of hemodynamics, respiratory status, ventilation and coordination of care.  Teaching and any separately billable procedures are not included in the time calculation.    Billing Provider Critical Care Time:  60 minutes  ----------------------------------------           [1] insulin lispro, 0-10 Units, subcutaneous, Before meals & nightly  magnesium oxide, 400 mg of magnesium oxide, oral, Daily  pantoprazole, 40 mg, intravenous, BID  pravastatin, 20 mg, oral, Nightly  [2] norepinephrine, 0.01-1 mcg/kg/min, Last Rate: Stopped (07/14/25 2145)  [3] PRN medications: alteplase, dextrose, diphenhydrAMINE, glucagon, ondansetron ODT **OR** ondansetron, oxygen

## 2025-07-16 VITALS
HEIGHT: 73 IN | WEIGHT: 187.1 LBS | OXYGEN SATURATION: 96 % | HEART RATE: 97 BPM | TEMPERATURE: 98.2 F | BODY MASS INDEX: 24.8 KG/M2 | SYSTOLIC BLOOD PRESSURE: 113 MMHG | DIASTOLIC BLOOD PRESSURE: 79 MMHG | RESPIRATION RATE: 18 BRPM

## 2025-07-16 LAB
ALBUMIN SERPL BCP-MCNC: 3.8 G/DL (ref 3.4–5)
ANION GAP SERPL CALC-SCNC: 15 MMOL/L (ref 10–20)
BUN SERPL-MCNC: 19 MG/DL (ref 6–23)
CALCIUM SERPL-MCNC: 8.5 MG/DL (ref 8.6–10.3)
CHLORIDE SERPL-SCNC: 101 MMOL/L (ref 98–107)
CO2 SERPL-SCNC: 25 MMOL/L (ref 21–32)
CREAT SERPL-MCNC: 0.93 MG/DL (ref 0.5–1.3)
EGFRCR SERPLBLD CKD-EPI 2021: 86 ML/MIN/1.73M*2
ERYTHROCYTE [DISTWIDTH] IN BLOOD BY AUTOMATED COUNT: 15.1 % (ref 11.5–14.5)
GLUCOSE BLD MANUAL STRIP-MCNC: 145 MG/DL (ref 74–99)
GLUCOSE BLD MANUAL STRIP-MCNC: 185 MG/DL (ref 74–99)
GLUCOSE BLD MANUAL STRIP-MCNC: 211 MG/DL (ref 74–99)
GLUCOSE SERPL-MCNC: 128 MG/DL (ref 74–99)
HCT VFR BLD AUTO: 38 % (ref 41–52)
HGB BLD-MCNC: 12.7 G/DL (ref 13.5–17.5)
MAGNESIUM SERPL-MCNC: 1.63 MG/DL (ref 1.6–2.4)
MCH RBC QN AUTO: 27.7 PG (ref 26–34)
MCHC RBC AUTO-ENTMCNC: 33.4 G/DL (ref 32–36)
MCV RBC AUTO: 83 FL (ref 80–100)
NRBC BLD-RTO: 0.3 /100 WBCS (ref 0–0)
PHOSPHATE SERPL-MCNC: 2.9 MG/DL (ref 2.5–4.9)
PLATELET # BLD AUTO: 155 X10*3/UL (ref 150–450)
POTASSIUM SERPL-SCNC: 3.8 MMOL/L (ref 3.5–5.3)
RBC # BLD AUTO: 4.59 X10*6/UL (ref 4.5–5.9)
SODIUM SERPL-SCNC: 137 MMOL/L (ref 136–145)
WBC # BLD AUTO: 6.3 X10*3/UL (ref 4.4–11.3)

## 2025-07-16 PROCEDURE — 85027 COMPLETE CBC AUTOMATED: CPT | Performed by: INTERNAL MEDICINE

## 2025-07-16 PROCEDURE — 36415 COLL VENOUS BLD VENIPUNCTURE: CPT | Performed by: INTERNAL MEDICINE

## 2025-07-16 PROCEDURE — 80069 RENAL FUNCTION PANEL: CPT | Performed by: INTERNAL MEDICINE

## 2025-07-16 PROCEDURE — 2500000004 HC RX 250 GENERAL PHARMACY W/ HCPCS (ALT 636 FOR OP/ED): Performed by: NURSE PRACTITIONER

## 2025-07-16 PROCEDURE — 83735 ASSAY OF MAGNESIUM: CPT | Performed by: NURSE PRACTITIONER

## 2025-07-16 PROCEDURE — 2500000002 HC RX 250 W HCPCS SELF ADMINISTERED DRUGS (ALT 637 FOR MEDICARE OP, ALT 636 FOR OP/ED): Performed by: NURSE PRACTITIONER

## 2025-07-16 PROCEDURE — 82947 ASSAY GLUCOSE BLOOD QUANT: CPT

## 2025-07-16 RX ORDER — PANTOPRAZOLE SODIUM 40 MG/1
40 TABLET, DELAYED RELEASE ORAL
Status: DISCONTINUED | OUTPATIENT
Start: 2025-07-17 | End: 2025-07-16 | Stop reason: HOSPADM

## 2025-07-16 RX ORDER — GLIPIZIDE 5 MG/1
2.5 TABLET ORAL
Start: 2025-07-16

## 2025-07-16 RX ORDER — PRAVASTATIN SODIUM 20 MG/1
20 TABLET ORAL NIGHTLY
Qty: 30 TABLET | Refills: 0 | Status: SHIPPED | OUTPATIENT
Start: 2025-07-16 | End: 2025-07-16 | Stop reason: HOSPADM

## 2025-07-16 RX ORDER — PANTOPRAZOLE SODIUM 40 MG/1
40 TABLET, DELAYED RELEASE ORAL
Qty: 60 TABLET | Refills: 2 | Status: SHIPPED | OUTPATIENT
Start: 2025-07-17 | End: 2025-10-15

## 2025-07-16 RX ADMIN — PANTOPRAZOLE SODIUM 40 MG: 40 INJECTION, POWDER, FOR SOLUTION INTRAVENOUS at 06:05

## 2025-07-16 RX ADMIN — INSULIN LISPRO 4 UNITS: 100 INJECTION, SOLUTION INTRAVENOUS; SUBCUTANEOUS at 12:31

## 2025-07-16 RX ADMIN — Medication 1 TABLET: at 09:24

## 2025-07-16 NOTE — PROGRESS NOTES
PROGRESS NOTE - INTERNAL MEDICINE     PATIENT NAME:  Juan Alberto Salcido    MRN:  [id number]  SERVICE DATE:  7/16/2025       ADMITTING PHYSICIAN:  Red Nina MD    ASSESSMENT AND PLAN    Acute gastritis causing UGI bleed K29.01  FLORENTINO better  Hypotension, probably from hypovolemic shock, POA, augmented by antihypertensive medications.  Improving  Acute blood loss anemia, s/p 2 units of blood transfusion  Hx hypertension  Hyperlipidemia  DM2 uncontr now, A1c 6.1 NOW, 7.4, 4/25  Mild thrombocytopenia, relatively stable currently.  Uncertain etiology.     PLAN:  PPI twice daily.   On solid diet.  Stable Hb, platelets, renal function.  Accuch ACHS SSI hypogly protocol. Diab diet  HOLD BP meds for now. Restart as needed post DC.  Restart DM meds - lower dose of glipizide and prior doses of metformin and Januvia.  DVT Proph with SCDs.     Discharge is planned for today to home. Hospital course , medication changes and Investigation's conducted were reviewed with the patient / Family. Activity, Diet and Medications after discharge were reviewed with the patient / Family. Medication reconciliation done - pls refer to medication reconciliation sheet.Follow up with Primary Care Physician were reviewed with the patient / Family. Discharge planning was discussed with staff. Refer to discharge orders sheet. Total time to coordinate disch process incl counseling family, coordinating with other care givers,  and pharmacist was >35 min.        INTERVAL HISTORY OF PRESENT ILLNESS:  Black stool earlier today. No red blood. No chest pain/sob. No n/v/d. Oral intake good - with solids. Feeling better. No fever/chills. acute events from last 24 hrs reviewed.     Pertinent ROS:  No abdominal pain / No Bleeding / No rashes     Discussed with nursing and case management team and the specialists involved in this patient's care. Reviewed the EMR and documentation from other care-givers.        OBJECTIVE  PHYSICAL EXAM:       GENERAL: awake, alert, Ox3, cooperative resting comfortably  SKIN: Skin turgor normal. No rashes  HEENT: no epistaxis, Moist mucosa.  LUNGS: Bilat breath sounds, with no added sounds  CARDIAC: REGULAR. no rubs, no murmur  ABDOMEN: soft, non-tender. +BS.  EXTREMITIES: No edema, Good capillary refill.   NEURO: Insight GOOD. No invol movements. Gait not assessed  MUSCULOSKELETAL: No acute inflammation          7/15/2025     4:00 PM 7/15/2025     4:54 PM 7/15/2025     5:00 PM 7/15/2025     7:00 PM 7/15/2025    11:46 PM 7/16/2025     3:00 AM 7/16/2025     7:37 AM   Vitals   Systolic  127  121 112 124 117   Diastolic  88  75 77 75 75   BP Location  Right arm  Left arm Left arm Left arm Left arm   Heart Rate 62 90 84  78  92   Temp  36.6 °C (97.9 °F)  36.7 °C (98.1 °F) 36.9 °C (98.4 °F) 36.8 °C (98.2 °F) 36.7 °C (98.1 °F)   Resp 15 18 14 18 18 18 17     Body mass index is 24.69 kg/m².    Intake/Output Summary (Last 24 hours) at 7/16/2025 0936  Last data filed at 7/15/2025 1729  Gross per 24 hour   Intake 500 ml   Output 750 ml   Net -250 ml         Current Medications[1]    DATA:   Diagnostic tests reviewed for today's visit:    Most recent labs  Results from last 7 days   Lab Units 07/16/25  0520 07/15/25  0349 07/14/25  1116   WBC AUTO x10*3/uL 6.3 8.5 7.3   HEMOGLOBIN g/dL 12.7* 11.7* 11.7*   HEMATOCRIT % 38.0* 35.6* 35.0*   PLATELETS AUTO x10*3/uL 155 141* 146*     Results from last 7 days   Lab Units 07/16/25  0520 07/15/25  0349 07/14/25  0431 07/13/25  1556   SODIUM mmol/L 137 137 138 135*   POTASSIUM mmol/L 3.8 4.0 4.1 5.4*   CHLORIDE mmol/L 101 102 104 101   CO2 mmol/L 25 29 26 17*   BUN mg/dL 19 35* 64* 77*   CREATININE mg/dL 0.93 1.20 1.29 1.96*   CALCIUM mg/dL 8.5* 9.0 9.0 9.5   PROTEIN TOTAL g/dL  --   --  6.4 7.1   BILIRUBIN TOTAL mg/dL  --   --  1.0 0.4   ALK PHOS U/L  --   --  44 46   ALT U/L  --   --  19 12   AST U/L  --   --  16 10   GLUCOSE mg/dL 128* 132* 93 168*             Results from last 7 days    Lab Units 07/16/25  0739 07/15/25  2156 07/15/25  1719 07/15/25  1122 07/15/25  0804 07/14/25  1921 07/14/25  1842 07/14/25  1528 07/14/25  1109 07/14/25  0723 07/14/25  0311 07/13/25  2348   POCT GLUCOSE mg/dL 145* 121* 201* 189* 167* 215* 252* 177* 158* 158* 128* 166*        XR chest 1 view   Final Result   1.  Right IJ central line with the tip overlying the region of the   SVC. No radiographic evidence of pneumothorax.                  MACRO:   None.        Signed by: Lola Gates 7/13/2025 11:41 PM   Dictation workstation:   [id number]            SIGNATURE: Kalie Nascimento MD PATIENT NAME: Juan Alberto Salcido   DATE: 7/16/2025 MRN: [id number]   TIME: 9:36 AM             [1]   Current Facility-Administered Medications:     alteplase (Cathflo Activase) injection 2 mg, 2 mg, intra-catheter, PRN, Hemalatha H Samantha, APRN-CNP, DNP    dextrose 50 % injection 12.5 g, 12.5 g, intravenous, q15 min PRN, Hemalatha H Samantha, APRN-CNP, DNP    diphenhydrAMINE (BENADryl) injection 25 mg, 25 mg, intravenous, q4h PRN, Hemalatha H Samantha, APRN-CNP, DNP    glucagon (Glucagen) injection 1 mg, 1 mg, intramuscular, q15 min PRN, Hemalatha H Samantha, APRN-CNP, DNP    insulin lispro injection 0-10 Units, 0-10 Units, subcutaneous, Before meals & nightly, Hemalatha H Samantha, APRN-CNP, DNP, 4 Units at 07/15/25 1700    magnesium oxide (Mag-Ox) 400 mg (241.3 mg elemental) tablet 1 tablet, 400 mg of magnesium oxide, oral, Daily, Hemalatha H Samantha, APRN-CNP, DNP, 1 tablet at 07/16/25 0924    ondansetron ODT (Zofran-ODT) disintegrating tablet 4 mg, 4 mg, oral, q8h PRN **OR** ondansetron (Zofran) injection 4 mg, 4 mg, intravenous, q8h PRN, Hemalatha Singh, APRJUAN-CNP, DNP    oxygen (O2) therapy, , inhalation, Continuous PRN - O2/gases, Hemalatha H Samantha, APRN-CNP, DNP    pantoprazole (Protonix) injection 40 mg, 40 mg, intravenous, Daily before breakfast, Hemalatha H Samantha, APRN-CNP, DNP, 40 mg at 07/16/25 0605    pravastatin (Pravachol) tablet 20 mg, 20 mg, oral, Nightly,  Hemalatha Singh, APRN-CNP, DNP, 20 mg at 07/15/25 212

## 2025-07-16 NOTE — PROGRESS NOTES
07/16/25 0929   Discharge Planning   Expected Discharge Disposition Home   Does the patient need discharge transport arranged? Yes   [facility] coordination needed? Yes   Has discharge transport been arranged? No          Patient came with melena. His H/H this morning was 38.0/12.7.  His FLORENTINO resolved. When patient is medically ready will go home with his wife, no needs identified.

## 2025-07-16 NOTE — CARE PLAN
The patient's goals for the shift include      The clinical goals for the shift include patient will remain HDS    Over the shift, the patient did not make progress toward the following goals. Barriers to progression include n/a. Recommendations to address these barriers include n/a.

## 2025-07-17 ENCOUNTER — TELEPHONE (OUTPATIENT)
Dept: PRIMARY CARE | Facility: CLINIC | Age: 75
End: 2025-07-17
Payer: MEDICARE

## 2025-07-17 NOTE — TELEPHONE ENCOUNTER
Pt called in today saying he does have an appointment on August 20th with provider, but he has just been released from hospital and was told that he needed to get earlier appointment to be seen.

## 2025-07-17 NOTE — DISCHARGE SUMMARY
Discharge Summary    Admit Date: 7/13/2025  Discharge Date: 7/16/2025      Discharge Diagnosis  Acute gastritis causing UGI bleed K29.01  FLORENTINO better  Hypotension, probably from hypovolemic shock, POA, augmented by antihypertensive medications.  Improving  Acute blood loss anemia, s/p 2 units of blood transfusion  Hx hypertension  Hyperlipidemia  DM2 uncontr now, A1c 6.1 NOW, 7.4, 4/25  Mild thrombocytopenia, relatively stable currently.  Uncertain etiology.    Issues Requiring Follow-Up  BP and glu    Test Results Pending At Discharge  Pending Labs       Order Current Status    CBC Anemia Panel With Reflex, Pregnancy In process    Surgical Pathology Exam In process            Hospital Course   Admitted with hematemesis and melena.  Stable Hb during hospital stay.  EGD with severe gastritis, likely source of bleeding.  PPI twice a day continued in the hospital and transition to oral agents on discharge.  Antihypertensives held due to low blood pressure on admission.  BP improved but in normal range at time of discharge without medications.  FLORENTINO on admission improved.  DM2 medications resumed at lower dose on discharge.  These medications may need to be resumed or titrated as needed during follow-up.  Biopsy report pending at time of discharge.    Pertinent Physical Exam At Time of Discharge  Physical Exam    Home Medications     Medication List      PAUSE taking these medications     hydroCHLOROthiazide 25 mg tablet; Wait to take this until your doctor or   other care provider tells you to start again.; PENDING FOLLOW UP WITH DR MINOR; Commonly known as: HYDRODiuril; TAKE ONE-HALF TABLET BY MOUTH ONCE   DAILY   lisinopril 20 mg tablet; Wait to take this until your doctor or other   care provider tells you to start again.; PENDING FOLLOW UP WITH DR MINOR;   TAKE ONE TABLET BY MOUTH EVERY DAY     START taking these medications     pantoprazole 40 mg EC tablet; Commonly known as: ProtoNix; Take 1 tablet   (40 mg) by  mouth 2 times a day before meals. Do not crush, chew, or split.   Do not fill before July 17, 2025.; Start taking on: July 17, 2025     CHANGE how you take these medications     glipiZIDE 5 mg tablet; Commonly known as: Glucotrol; Take 0.5 tablets   (2.5 mg) by mouth 2 times a day before meals. TAKE 0.5 TABLETS BY MOUTH   WITH BREAKFAST AND 0.5 TABLET WITH DINNER; What changed: See the new   instructions.     CONTINUE taking these medications     ascorbic acid 1,000 mg tablet; Commonly known as: Vitamin C   aspirin 81 mg EC tablet   atenolol 50 mg tablet; Commonly known as: Tenormin; TAKE ONE TABLET BY   MOUTH EVERY DAY   Farxiga 10 mg tablet; Generic drug: dapagliflozin propanediol; Take 1   tablet (10 mg) by mouth once daily.   Januvia 100 mg tablet; Generic drug: SITagliptin phosphate; Take 1   tablet (100 mg) by mouth once daily.   latanoprost 0.005 % ophthalmic solution; Commonly known as: Xalatan   lovastatin 20 mg tablet; Commonly known as: Mevacor; TAKE ONE TABLET BY   MOUTH EVERY DAY   metFORMIN 1,000 mg tablet; Commonly known as: Glucophage; TAKE ONE   TABLET BY MOUTH TWO TIMES A DAY   sildenafil 100 mg tablet; Commonly known as: Viagra; Take 1 tablet (100   mg) by mouth if needed for erectile dysfunction.   timolol 0.5 % ophthalmic solution; Commonly known as: Timoptic       Outpatient Follow-Up  Future Appointments   Date Time Provider Department Center   8/7/2025  3:40 PM Daily Watson PharmD [id number]   8/25/2025  8:30 AM Deborah Hartmann MD [id number] [facility]       Kalie Nascimento MD

## 2025-07-18 LAB
LABORATORY COMMENT REPORT: NORMAL
PATH REPORT.FINAL DX SPEC: NORMAL
PATH REPORT.GROSS SPEC: NORMAL
PATH REPORT.MICROSCOPIC SPEC OTHER STN: NORMAL
PATH REPORT.RELEVANT HX SPEC: NORMAL
PATH REPORT.TOTAL CANCER: NORMAL

## 2025-07-22 ENCOUNTER — APPOINTMENT (OUTPATIENT)
Dept: PRIMARY CARE | Facility: CLINIC | Age: 75
End: 2025-07-22
Payer: MEDICARE

## 2025-07-22 VITALS
DIASTOLIC BLOOD PRESSURE: 74 MMHG | HEART RATE: 86 BPM | WEIGHT: 187.8 LBS | BODY MASS INDEX: 25.44 KG/M2 | SYSTOLIC BLOOD PRESSURE: 100 MMHG | TEMPERATURE: 96.9 F | HEIGHT: 72 IN | OXYGEN SATURATION: 99 %

## 2025-07-22 DIAGNOSIS — K92.2 UPPER GI BLEED: Primary | ICD-10-CM

## 2025-07-22 DIAGNOSIS — E11.65 TYPE 2 DIABETES MELLITUS WITH HYPERGLYCEMIA, WITHOUT LONG-TERM CURRENT USE OF INSULIN: ICD-10-CM

## 2025-07-22 DIAGNOSIS — I10 ESSENTIAL (PRIMARY) HYPERTENSION: ICD-10-CM

## 2025-07-22 PROCEDURE — 3074F SYST BP LT 130 MM HG: CPT | Performed by: FAMILY MEDICINE

## 2025-07-22 PROCEDURE — 3078F DIAST BP <80 MM HG: CPT | Performed by: FAMILY MEDICINE

## 2025-07-22 PROCEDURE — 4010F ACE/ARB THERAPY RXD/TAKEN: CPT | Performed by: FAMILY MEDICINE

## 2025-07-22 PROCEDURE — 1111F DSCHRG MED/CURRENT MED MERGE: CPT | Performed by: FAMILY MEDICINE

## 2025-07-22 PROCEDURE — 99214 OFFICE O/P EST MOD 30 MIN: CPT | Performed by: FAMILY MEDICINE

## 2025-07-22 PROCEDURE — 1159F MED LIST DOCD IN RCRD: CPT | Performed by: FAMILY MEDICINE

## 2025-07-22 ASSESSMENT — ENCOUNTER SYMPTOMS
OCCASIONAL FEELINGS OF UNSTEADINESS: 0
DEPRESSION: 0
LOSS OF SENSATION IN FEET: 0

## 2025-07-22 NOTE — PROGRESS NOTES
"Subjective   Patient ID: Juan Alberto Salcido is a 75 y.o. male who presents for Hospital Follow-up.  Patient presents for hospital follow up.  Admitted 7/13/25-7/16/25 for acute gastritis leading to UGI bleed.  Discharge summary copied in part below:  \"Discharge Diagnosis  Acute gastritis causing UGI bleed K29.01  FLORENTINO better  Hypotension, probably from hypovolemic shock, POA, augmented by antihypertensive medications.  Improving  Acute blood loss anemia, s/p 2 units of blood transfusion  Hx hypertension  Hyperlipidemia  DM2 uncontr now, A1c 6.1 NOW, 7.4, 4/25  Mild thrombocytopenia, relatively stable currently.  Uncertain etiology.     Issues Requiring Follow-Up  BP and glu    Hospital Course   Admitted with hematemesis and melena.  Stable Hb during hospital stay.  EGD with severe gastritis, likely source of bleeding.  PPI twice a day continued in the hospital and transition to oral agents on discharge.  Antihypertensives held due to low blood pressure on admission.  BP improved but in normal range at time of discharge without medications.  FLORENTINO on admission improved.  DM2 medications resumed at lower dose on discharge.  These medications may need to be resumed or titrated as needed during follow-up.  Biopsy report pending at time of discharge.\"  _________________________    Feeling better now.  Started back to work at reduced hours.  Energy is good.    Has BP cuff at home.  Has been monitoring levels, and it has been good.  Numbers have ranged from 100-130 systolic.  His is off of the lisinopril and hydrochlorothiazide.  Not dizzy.  No CP or SOB.  No swelling.    No abdominal pain.  Taking pantoprazole BID.  No melena, nausea, vomiting.  Appetite has been good.    Glucose is running well, 125-135 in AM.  Last A1C was 6.1, while in hospital.  He is currently taking farxiga, januvia, metformin, and glipizide.        Review of Systems    Objective   /74 (BP Location: Right arm, Patient Position: Sitting, BP Cuff " Size: Large adult)   Pulse 86   Temp 36.1 °C (96.9 °F) (Temporal)   Ht 1.829 m (6')   Wt 85.2 kg (187 lb 12.8 oz)   SpO2 99%   BMI 25.47 kg/m²     Physical Exam  Vitals reviewed.   Constitutional:       Appearance: Normal appearance.     Cardiovascular:      Rate and Rhythm: Normal rate and regular rhythm.      Heart sounds: No murmur heard.  Pulmonary:      Effort: Pulmonary effort is normal.      Breath sounds: Normal breath sounds.   Abdominal:      General: There is no distension.      Palpations: Abdomen is soft. There is no mass.      Tenderness: There is no abdominal tenderness. There is no guarding or rebound.     Musculoskeletal:      Right lower leg: No edema.      Left lower leg: No edema.     Neurological:      Mental Status: He is alert.           Assessment/Plan   Problem List Items Addressed This Visit       Type 2 diabetes mellitus with hyperglycemia, without long-term current use of insulin    Essential (primary) hypertension     Other Visit Diagnoses         Upper GI bleed    -  Primary

## 2025-07-22 NOTE — PATIENT INSTRUCTIONS
Follow up after hospitalization for upper GI bleed.  Symptomatically improved.  EGD done showed showed gastritis, no ulcer.  Continue pantoprazole 40 mg twice a day prior to meals.    Two BP medications stopped at the hospital (lisinopril and hydrochlorothiazide).  Continue on the atenolol.  For now remain off of the lisinopril and hydrochlorothiazide.  Continue to monitor BP at home.  If it is getting up toward 130, would restart lisinopril, possibly at lower dose.    For diabetes, continue farxiga, januvia, metformin, and glipizide.    If continuing to feel well, plan to follow up in 3 months.  Reach out sooner if problems.

## 2025-07-29 LAB
ATRIAL RATE: 98 BPM
P AXIS: 64 DEGREES
P OFFSET: 199 MS
P ONSET: 155 MS
PR INTERVAL: 140 MS
Q ONSET: 225 MS
QRS COUNT: 16 BEATS
QRS DURATION: 72 MS
QT INTERVAL: 358 MS
QTC CALCULATION(BAZETT): 457 MS
QTC FREDERICIA: 421 MS
R AXIS: 53 DEGREES
T AXIS: 60 DEGREES
T OFFSET: 404 MS
VENTRICULAR RATE: 98 BPM

## 2025-08-07 ENCOUNTER — APPOINTMENT (OUTPATIENT)
Dept: PHARMACY | Facility: HOSPITAL | Age: 75
End: 2025-08-07
Payer: MEDICARE

## 2025-08-07 DIAGNOSIS — E11.65 TYPE 2 DIABETES MELLITUS WITH HYPERGLYCEMIA, WITHOUT LONG-TERM CURRENT USE OF INSULIN: Primary | ICD-10-CM

## 2025-08-07 NOTE — PROGRESS NOTES
Clinical Pharmacy Appointment    Patient ID: Juan Alberto Salcido is a 75 y.o. male who presents for Diabetes.    Pt is here for Follow Up appointment.     Referring Provider: Deborah Hartmann MD  PCP: Deborah Hartmann MD  Last visit with PCP: 4/21/2025   Next visit with PCP: 8/25/2025    Subjective   Drug Interactions  No relevant drug interactions were noted.    Medication System Management  Patient's preferred pharmacy:  [id number] in [place]  Adherence/Organization: No concerns at this time  Affordability/Accessibility:  PAP approved through 2/21/2026    HPI  DIABETES MELLITUS TYPE 2:    Does patient follow with Endocrinology: No  Last optometry exam: 12/2024  Most recent visit in Podiatry: checks feet regularly at home -- patient denies sores or cuts on feet today      Current diabetic medications include:  Metformin 1000 mg; take 1 tablet BID  Glipizide 5 mg; take 1 tablets in the mornings and 1 tablet (5 mg) with dinner)  Farxiga 10 mg; take 1 tablet daily  Januvia 100 mg; take 1 tablet daily    Clarifications to above regimen: None  Adverse Effects: None    Glucose Readings:  Glucometer/CGM Type: Glucometer  Patient tests BG 1 times per daily    Current home BG readings (mg/dL): 120s-130s mg/dL  Previous home BG readings (mg/dL): 116-140 mg/dL    Any episodes of hypoglycemia? No, patient has not been experiencing any low blood sugars.  Did patient treat episode of hypoglycemia appropriately? N/A  Does the patient have a prescription for ready-to-use Glucagon? Not on insulin    Lifestyle:  Diet: 2 meals/day.   BK: Cereal  DN: Fish, chicken, steak  Snacks: cookies  Drinks: water, soda, sometimes coffee  Exercise:   Patient works a physical job  Tobacco history: Non-smoker    Secondary Prevention:  Statin? Yes  ACE-I/ARB? Yes  Aspirin? Yes    Pertinent PMH Review:  PMH of Pancreatitis: No  PMH of Retinopathy: No  PMH of Urinary Tract Infections: No  PMH of MTC: No  PMH of MEN2:  No  UACR/EGFR in last year?: Yes  ALBUMIN/CREATININE RATIO, RANDOM URINE   Date Value Ref Range Status   04/21/2025 20 <30 mg/g creat Final     Comment:        The ADA defines abnormalities in albumin  excretion as follows:     Albuminuria Category        Result (mg/g creatinine)     Normal to Mildly increased   <30  Moderately increased            Severely increased           > OR = 300     The ADA recommends that at least two of three  specimens collected within a 3-6 month period be  abnormal before considering a patient to be  within a diagnostic category.       Albumin/Creatine Ratio   Date Value Ref Range Status   07/12/2023 82.6 (H) 0.0 - 30.0 ug/mg crt Final   07/20/2022 52.3 (H) 0.0 - 30.0 ug/mg crt Final       Objective   Allergies[1]  Social History     Social History Narrative    Not on file      Medication Review  Current Outpatient Medications   Medication Instructions    ascorbic acid (VITAMIN C) 1,000 mg, Daily    aspirin 81 mg, Daily    atenolol (TENORMIN) 50 mg, oral, Daily    Farxiga 10 mg, oral, Daily    glipiZIDE (GLUCOTROL) 2.5 mg, oral, 2 times daily before meals, TAKE 0.5 TABLETS BY MOUTH WITH BREAKFAST AND 0.5 TABLET WITH DINNER    [Paused] hydroCHLOROthiazide (HYDRODIURIL) 12.5 mg, oral, Daily    Januvia 100 mg, oral, Daily    latanoprost (Xalatan) 0.005 % ophthalmic solution 1 drop, Nightly    [Paused] lisinopril 20 mg, oral, Daily    lovastatin (MEVACOR) 20 mg, oral, Daily    metFORMIN (GLUCOPHAGE) 1,000 mg, oral, 2 times daily    pantoprazole (PROTONIX) 40 mg, oral, 2 times daily before meals, Do not crush, chew, or split.    sildenafil (VIAGRA) 100 mg, oral, As needed    timolol (Timoptic) 0.5 % ophthalmic solution 1 drop, 2 times daily      Vitals  BP Readings from Last 2 Encounters:   07/22/25 100/74   07/16/25 113/79     BMI Readings from Last 1 Encounters:   07/22/25 25.47 kg/m²      Labs  A1C  Lab Results   Component Value Date    HGBA1C 6.1 (H) 07/15/2025    HGBA1C 7.4 (H)  04/21/2025    HGBA1C 7.6 (A) 12/16/2024     BMP  Lab Results   Component Value Date    CALCIUM 8.5 (L) 07/16/2025     07/16/2025    K 3.8 07/16/2025    CO2 25 07/16/2025     07/16/2025    BUN 19 07/16/2025    CREATININE 0.93 07/16/2025    EGFR 86 07/16/2025     LFTs  Lab Results   Component Value Date    ALT 19 07/14/2025    AST 16 07/14/2025    ALKPHOS 44 07/14/2025    BILITOT 1.0 07/14/2025     FLP  Lab Results   Component Value Date    TRIG 78 04/21/2025    CHOL 73 04/21/2025    LDLF 10 07/12/2023    LDLCALC 17 04/21/2025    HDL 40 04/21/2025     Urine Microalbumin  Lab Results   Component Value Date    MICROALBCREA 20 04/21/2025     Weight Management  Wt Readings from Last 3 Encounters:   07/22/25 85.2 kg (187 lb 12.8 oz)   07/15/25 84.9 kg (187 lb 1.6 oz)   04/21/25 82.9 kg (182 lb 12.8 oz)      There is no height or weight on file to calculate BMI.     Assessment/Plan   Problem List Items Addressed This Visit       Type 2 diabetes mellitus with hyperglycemia, without long-term current use of insulin - Primary    Patient's goal A1c is < 7.5%.  Is pt at goal? Yes, patient's most recent A1c from 7/15/2025 was 6.1%  Patient's SMBGs are mostly at goal.     Rationale for plan: Patient's blood sugars are well controlled, continue with current regimen.    Medication Changes:  CONTINUE  Metformin 1000 mg; take 1 tablet BID  Glipizde 5 mg; take 1 tablets in the mornings and 1 tablet (5 mg) with dinner)  Farxiga 10 mg; take 1 tablet daily  Januvia 100 mg; take 1 tablet daily    Monitoring and Education:  Answered all patient questions and concerns         Relevant Orders    Referral to Clinical Pharmacy         Clinical Pharmacist follow-up: 9/4/2025, Telehealth visit    Patient is not followed in [facility]. (If yes, track minutes under compass joni at each visit)    Continue all meds under the continuation of care with the referring provider and clinical pharmacy team.    Thank you,  Daily Watson,  PharmD  Clinical Pharmacist - [facility]  [phone]  8/7/2025    Verbal consent to manage patient's drug therapy was obtained from the patient. They were informed they may decline to participate or withdraw from participation in pharmacy services at any time.         [1] No Known Allergies

## 2025-08-07 NOTE — ASSESSMENT & PLAN NOTE
Patient's goal A1c is < 7.5%.  Is pt at goal? Yes, patient's most recent A1c from 7/15/2025 was 6.1%  Patient's SMBGs are mostly at goal.     Rationale for plan: Patient's blood sugars are well controlled, continue with current regimen.    Medication Changes:  CONTINUE  Metformin 1000 mg; take 1 tablet BID  Glipizde 5 mg; take 1 tablets in the mornings and 1 tablet (5 mg) with dinner)  Farxiga 10 mg; take 1 tablet daily  Januvia 100 mg; take 1 tablet daily    Monitoring and Education:  Answered all patient questions and concerns

## 2025-08-18 DIAGNOSIS — E78.2 HYPERLIPEMIA, MIXED: ICD-10-CM

## 2025-08-18 RX ORDER — PRAVASTATIN SODIUM 20 MG/1
20 TABLET ORAL NIGHTLY
Qty: 90 TABLET | Refills: 1 | Status: SHIPPED | OUTPATIENT
Start: 2025-08-18 | End: 2026-02-14

## 2025-08-25 ENCOUNTER — APPOINTMENT (OUTPATIENT)
Dept: PRIMARY CARE | Facility: CLINIC | Age: 75
End: 2025-08-25
Payer: MEDICARE

## 2025-09-02 DIAGNOSIS — N52.9 MALE ERECTILE DISORDER OF ORGANIC ORIGIN: ICD-10-CM

## 2025-09-02 RX ORDER — SILDENAFIL 100 MG/1
TABLET, FILM COATED ORAL
Qty: 12 TABLET | Refills: 0 | Status: SHIPPED | OUTPATIENT
Start: 2025-09-02

## 2025-09-04 ENCOUNTER — OFFICE VISIT (OUTPATIENT)
Facility: CLINIC | Age: 75
End: 2025-09-04
Payer: MEDICARE

## 2025-09-04 ENCOUNTER — APPOINTMENT (OUTPATIENT)
Dept: PHARMACY | Facility: HOSPITAL | Age: 75
End: 2025-09-04
Payer: MEDICARE

## 2025-09-04 VITALS — WEIGHT: 186.6 LBS | HEART RATE: 92 BPM | BODY MASS INDEX: 25.27 KG/M2 | OXYGEN SATURATION: 98 % | HEIGHT: 72 IN

## 2025-09-04 DIAGNOSIS — A04.8 H. PYLORI INFECTION: Primary | ICD-10-CM

## 2025-09-04 PROCEDURE — 4010F ACE/ARB THERAPY RXD/TAKEN: CPT

## 2025-09-04 PROCEDURE — 1159F MED LIST DOCD IN RCRD: CPT

## 2025-09-04 PROCEDURE — 99213 OFFICE O/P EST LOW 20 MIN: CPT

## 2025-09-04 PROCEDURE — 3044F HG A1C LEVEL LT 7.0%: CPT

## 2025-10-09 ENCOUNTER — APPOINTMENT (OUTPATIENT)
Dept: PHARMACY | Facility: HOSPITAL | Age: 75
End: 2025-10-09
Payer: MEDICARE

## 2025-10-30 ENCOUNTER — APPOINTMENT (OUTPATIENT)
Dept: PRIMARY CARE | Facility: CLINIC | Age: 75
End: 2025-10-30
Payer: MEDICARE